# Patient Record
Sex: FEMALE | Race: WHITE | NOT HISPANIC OR LATINO | Employment: UNEMPLOYED | URBAN - METROPOLITAN AREA
[De-identification: names, ages, dates, MRNs, and addresses within clinical notes are randomized per-mention and may not be internally consistent; named-entity substitution may affect disease eponyms.]

---

## 2023-06-13 PROBLEM — N76.0 BACTERIAL VAGINOSIS: Status: ACTIVE | Noted: 2023-06-13

## 2023-06-13 PROBLEM — R07.81 RIB PAIN ON LEFT SIDE: Status: ACTIVE | Noted: 2023-06-13

## 2023-06-13 PROBLEM — J30.2 SEASONAL ALLERGIES: Status: RESOLVED | Noted: 2023-06-13 | Resolved: 2023-06-13

## 2023-06-13 PROBLEM — R10.2 PELVIC PAIN IN FEMALE: Status: ACTIVE | Noted: 2023-06-13

## 2023-06-13 PROBLEM — R07.89 CHEST PAIN, ATYPICAL: Status: RESOLVED | Noted: 2023-06-13 | Resolved: 2023-06-13

## 2023-06-13 PROBLEM — K21.9 GERD (GASTROESOPHAGEAL REFLUX DISEASE): Status: ACTIVE | Noted: 2023-06-13

## 2023-06-13 PROBLEM — R07.9 CHEST PAIN ON EXERTION: Status: ACTIVE | Noted: 2023-06-13

## 2023-06-13 PROBLEM — S99.922A INJURY OF ANKLE AND FOOT, LEFT, INITIAL ENCOUNTER: Status: RESOLVED | Noted: 2023-06-13 | Resolved: 2023-06-13

## 2023-06-13 PROBLEM — Z97.5 IUD (INTRAUTERINE DEVICE) IN PLACE: Status: ACTIVE | Noted: 2023-06-13

## 2023-06-13 PROBLEM — M89.8X1 PAIN OF RIGHT SCAPULA: Status: ACTIVE | Noted: 2023-06-13

## 2023-06-13 PROBLEM — O09.90: Status: ACTIVE | Noted: 2023-06-13

## 2023-06-13 PROBLEM — B96.89 BACTERIAL VAGINOSIS: Status: ACTIVE | Noted: 2023-06-13

## 2023-06-13 PROBLEM — M25.579 ANKLE JOINT PAIN: Status: ACTIVE | Noted: 2023-06-13

## 2023-06-13 PROBLEM — M79.18: Status: ACTIVE | Noted: 2023-06-13

## 2023-06-13 PROBLEM — R10.9 FLANK PAIN: Status: ACTIVE | Noted: 2023-06-13

## 2023-06-13 PROBLEM — E55.9 VITAMIN D DEFICIENCY: Status: ACTIVE | Noted: 2023-06-13

## 2023-06-13 PROBLEM — N92.6 IRREGULAR MENSES: Status: RESOLVED | Noted: 2023-06-13 | Resolved: 2023-06-13

## 2023-06-13 PROBLEM — Z96.9 RETAINED ORTHOPEDIC HARDWARE: Status: ACTIVE | Noted: 2023-06-13

## 2023-06-13 PROBLEM — K59.00 CONSTIPATION: Status: RESOLVED | Noted: 2023-06-13 | Resolved: 2023-06-13

## 2023-06-13 PROBLEM — R11.0 DAILY NAUSEA: Status: RESOLVED | Noted: 2023-06-13 | Resolved: 2023-06-13

## 2023-06-13 PROBLEM — N64.4 BREAST TENDERNESS IN FEMALE: Status: RESOLVED | Noted: 2023-06-13 | Resolved: 2023-06-13

## 2023-06-13 PROBLEM — M54.2 NECK PAIN, BILATERAL: Status: ACTIVE | Noted: 2023-06-13

## 2023-06-13 PROBLEM — M24.859: Status: ACTIVE | Noted: 2023-06-13

## 2023-06-13 PROBLEM — M25.539 WRIST PAIN: Status: ACTIVE | Noted: 2023-06-13

## 2023-06-13 PROBLEM — S99.912A INJURY OF ANKLE AND FOOT, LEFT, INITIAL ENCOUNTER: Status: RESOLVED | Noted: 2023-06-13 | Resolved: 2023-06-13

## 2023-06-13 PROBLEM — S16.1XXA STRAIN OF CERVICAL PORTION OF BOTH TRAPEZIUS MUSCLES: Status: RESOLVED | Noted: 2023-06-13 | Resolved: 2023-06-13

## 2023-06-13 PROBLEM — F41.9 ANXIETY: Status: ACTIVE | Noted: 2023-06-13

## 2023-06-13 PROBLEM — R53.83 FATIGUE: Status: RESOLVED | Noted: 2023-06-13 | Resolved: 2023-06-13

## 2023-06-13 PROBLEM — S93.629A SPRAIN OF TARSOMETATARSAL JOINT: Status: RESOLVED | Noted: 2023-06-13 | Resolved: 2023-06-13

## 2023-06-13 PROBLEM — G43.909 MIGRAINES: Status: ACTIVE | Noted: 2023-06-13

## 2023-06-13 PROBLEM — N89.8 VAGINAL DISCHARGE: Status: RESOLVED | Noted: 2023-06-13 | Resolved: 2023-06-13

## 2023-06-13 PROBLEM — J01.90 ACUTE SINUSITIS: Status: RESOLVED | Noted: 2023-06-13 | Resolved: 2023-06-13

## 2023-06-13 PROBLEM — H81.10 BENIGN POSITIONAL VERTIGO: Status: ACTIVE | Noted: 2023-06-13

## 2023-06-13 PROBLEM — M79.673 FOOT PAIN: Status: ACTIVE | Noted: 2023-06-13

## 2023-06-13 RX ORDER — CHOLECALCIFEROL (VITAMIN D3) 125 MCG
125 CAPSULE ORAL DAILY
COMMUNITY

## 2023-06-13 RX ORDER — SERTRALINE HYDROCHLORIDE 25 MG/1
1 TABLET, FILM COATED ORAL DAILY
COMMUNITY
Start: 2022-05-20 | End: 2023-06-14 | Stop reason: ALTCHOICE

## 2023-06-13 RX ORDER — LANOLIN ALCOHOL/MO/W.PET/CERES
1 CREAM (GRAM) TOPICAL DAILY
COMMUNITY
Start: 2020-03-18

## 2023-06-13 RX ORDER — HYDROXYZINE PAMOATE 25 MG/1
25 CAPSULE ORAL 3 TIMES DAILY PRN
COMMUNITY
Start: 2021-12-22 | End: 2023-06-14 | Stop reason: ALTCHOICE

## 2023-06-13 RX ORDER — COPPER 313.4 MG/1
1 INTRAUTERINE DEVICE INTRAUTERINE ONCE
COMMUNITY

## 2023-06-13 RX ORDER — MONTELUKAST SODIUM 10 MG/1
1 TABLET ORAL EVERY EVENING
COMMUNITY
Start: 2019-10-09 | End: 2023-06-14 | Stop reason: ALTCHOICE

## 2023-06-13 RX ORDER — SUMATRIPTAN SUCCINATE 25 MG/1
TABLET ORAL SEE ADMIN INSTRUCTIONS
COMMUNITY
Start: 2021-12-22 | End: 2023-06-14 | Stop reason: SDUPTHER

## 2023-06-14 ENCOUNTER — OFFICE VISIT (OUTPATIENT)
Dept: PRIMARY CARE | Facility: CLINIC | Age: 33
End: 2023-06-14
Payer: COMMERCIAL

## 2023-06-14 VITALS
OXYGEN SATURATION: 97 % | RESPIRATION RATE: 18 BRPM | DIASTOLIC BLOOD PRESSURE: 80 MMHG | SYSTOLIC BLOOD PRESSURE: 128 MMHG | WEIGHT: 194 LBS | HEIGHT: 63 IN | BODY MASS INDEX: 34.38 KG/M2 | HEART RATE: 100 BPM

## 2023-06-14 DIAGNOSIS — E55.9 VITAMIN D DEFICIENCY: Primary | ICD-10-CM

## 2023-06-14 DIAGNOSIS — G43.109 MIGRAINE WITH AURA AND WITHOUT STATUS MIGRAINOSUS, NOT INTRACTABLE: ICD-10-CM

## 2023-06-14 DIAGNOSIS — E53.8 B12 DEFICIENCY: ICD-10-CM

## 2023-06-14 DIAGNOSIS — G25.81 RESTLESS LEG: ICD-10-CM

## 2023-06-14 DIAGNOSIS — E66.9 OBESITY (BMI 30.0-34.9): ICD-10-CM

## 2023-06-14 PROCEDURE — 99214 OFFICE O/P EST MOD 30 MIN: CPT | Performed by: STUDENT IN AN ORGANIZED HEALTH CARE EDUCATION/TRAINING PROGRAM

## 2023-06-14 PROCEDURE — 1036F TOBACCO NON-USER: CPT | Performed by: STUDENT IN AN ORGANIZED HEALTH CARE EDUCATION/TRAINING PROGRAM

## 2023-06-14 RX ORDER — CYCLOBENZAPRINE HCL 5 MG
5 TABLET ORAL NIGHTLY PRN
Qty: 30 TABLET | Refills: 2 | Status: SHIPPED | OUTPATIENT
Start: 2023-06-14 | End: 2023-09-12

## 2023-06-14 RX ORDER — SUMATRIPTAN SUCCINATE 25 MG/1
25 TABLET ORAL SEE ADMIN INSTRUCTIONS
Qty: 9 TABLET | Refills: 1 | Status: SHIPPED | OUTPATIENT
Start: 2023-06-14 | End: 2024-02-13 | Stop reason: ALTCHOICE

## 2023-06-14 ASSESSMENT — ENCOUNTER SYMPTOMS
SHORTNESS OF BREATH: 0
TROUBLE SWALLOWING: 0
POLYPHAGIA: 0
CONSTIPATION: 0
WHEEZING: 0
ACTIVITY CHANGE: 0
WOUND: 0
CHILLS: 0
ABDOMINAL PAIN: 0
FEVER: 0
FATIGUE: 0
LOSS OF SENSATION IN FEET: 0
HEMATURIA: 0
NERVOUS/ANXIOUS: 0
ABDOMINAL DISTENTION: 0
NAUSEA: 0
COUGH: 0
DIZZINESS: 0
CONFUSION: 0
SLEEP DISTURBANCE: 0
EYE DISCHARGE: 0
SINUS PAIN: 0
POLYDIPSIA: 0
DEPRESSION: 0
BLOOD IN STOOL: 0
WEAKNESS: 0
HEADACHES: 0
OCCASIONAL FEELINGS OF UNSTEADINESS: 0

## 2023-06-14 ASSESSMENT — PATIENT HEALTH QUESTIONNAIRE - PHQ9
1. LITTLE INTEREST OR PLEASURE IN DOING THINGS: NOT AT ALL
SUM OF ALL RESPONSES TO PHQ9 QUESTIONS 1 AND 2: 0
2. FEELING DOWN, DEPRESSED OR HOPELESS: NOT AT ALL

## 2023-06-14 ASSESSMENT — ANXIETY QUESTIONNAIRES
1. FEELING NERVOUS, ANXIOUS, OR ON EDGE: NOT AT ALL
2. NOT BEING ABLE TO STOP OR CONTROL WORRYING: NOT AT ALL
3. WORRYING TOO MUCH ABOUT DIFFERENT THINGS: NOT AT ALL
7. FEELING AFRAID AS IF SOMETHING AWFUL MIGHT HAPPEN: NOT AT ALL
4. TROUBLE RELAXING: NOT AT ALL
5. BEING SO RESTLESS THAT IT IS HARD TO SIT STILL: NOT AT ALL
6. BECOMING EASILY ANNOYED OR IRRITABLE: NOT AT ALL
GAD7 TOTAL SCORE: 0

## 2023-06-14 NOTE — PROGRESS NOTES
Subjective   Patient ID: Chandan Villarreal is a 33 y.o. female who presents for check up (moving) and Leg Cramps (At night).  HPI    33-year-old female medical history of obesity, migraine with aura, restless leg, vitamin D deficiency, anxiety now in remission presents to the clinic for follow-up and evaluation of leg cramps.      Muscle cramps occur on bilateral lower extremity mostly at night.  Patient endorses urge to move her leg.  She states sometimes taking hot shower helps.  She denies fever or chills, denies chest pain.  Denies tobacco use, denies alcohol use. Was told after sleep study that he has restless leg    Her other problems include migraine headache.  States migraine occurs 2-3 times per week.  Migraine is left-side of her forehead.  Describes migraine as photophobic.  Aura include left ear tingling and pain. Treatment has been OTC tylenol. She tolerated sumatriptan and would like a refill     Medications and allergy list: Reviewed and updated    Previous labs and notes reviewed and discussed    Vitals:  Reviewed and discussed     Review of Systems   Constitutional:  Negative for activity change, chills, fatigue and fever.   HENT:  Negative for congestion, ear discharge, sinus pain and trouble swallowing.    Eyes:  Negative for discharge and visual disturbance.   Respiratory:  Negative for cough, shortness of breath and wheezing.    Cardiovascular:  Negative for chest pain and leg swelling.   Gastrointestinal:  Negative for abdominal distention, abdominal pain, blood in stool, constipation and nausea.   Endocrine: Negative for polydipsia and polyphagia.   Genitourinary:  Negative for hematuria.   Musculoskeletal:  Negative for gait problem.   Skin:  Negative for wound.   Allergic/Immunologic: Negative for food allergies.   Neurological:  Negative for dizziness, weakness and headaches.   Psychiatric/Behavioral:  Negative for confusion, sleep disturbance and suicidal ideas. The patient is not  nervous/anxious.        Objective   Physical Exam  Vitals and nursing note reviewed.   Constitutional:       Appearance: Normal appearance. She is normal weight.   HENT:      Head: Normocephalic and atraumatic.      Right Ear: Tympanic membrane normal.      Left Ear: Tympanic membrane normal.      Mouth/Throat:      Mouth: Mucous membranes are dry.   Eyes:      Extraocular Movements: Extraocular movements intact.      Conjunctiva/sclera: Conjunctivae normal.   Cardiovascular:      Rate and Rhythm: Normal rate.      Pulses: Normal pulses.   Pulmonary:      Effort: Pulmonary effort is normal. No respiratory distress.      Breath sounds: Normal breath sounds.   Abdominal:      General: Bowel sounds are normal. There is no distension.      Palpations: Abdomen is soft. There is no mass.   Musculoskeletal:         General: Normal range of motion.      Cervical back: Normal range of motion and neck supple.   Skin:     General: Skin is warm and dry.   Neurological:      General: No focal deficit present.      Mental Status: She is alert. Mental status is at baseline.   Psychiatric:         Mood and Affect: Mood normal.         Assessment/Plan   Restless leg.  We will obtain iron studies, TSH to evaluate thyroid function, serum electrolytes, and B12 level.  We will start treating patient with Flexeril as needed nightly. Side effect discussed.  If iron study is positive will start patient on iron deficiency every other day.    Migraine headache.  We will refill her sumatriptan.  She denies being pregnant.  Side effects including chest pain discussed with patient.    Obesity.  Recommend activity and dietary modification.    B12 deficiency.  We will check B12 level    Vitamin D deficiency.  We will recheck vitamin D serum level.    Health care maintenance. Will need screening for cervical cancer with PAP smear    Problem List Items Addressed This Visit       Migraines    Relevant Medications    SUMAtriptan (Imitrex) 25 mg  tablet    Other Relevant Orders    Follow Up In [facility] - PCP    Vitamin D deficiency - Primary    Relevant Orders    Vitamin D 25-Hydroxy,Total    Follow Up [facility] - PCP     Other Visit Diagnoses       B12 deficiency        Relevant Orders    Vitamin B12    Follow Up [facility] - PCP    Restless leg        Relevant Medications    cyclobenzaprine (Flexeril) 5 mg tablet    Other Relevant Orders    Comprehensive metabolic panel    CBC    Ferritin    Iron and TIBC    Vitamin D 25-Hydroxy,Total    Tsh With Reflex To Free T4 If Abnormal    Vitamin B12    Follow Up In [facility] - PCP    Obesity (BMI 30.0-34.9)        Relevant Orders    Follow Up In [facility] - PCP

## 2023-06-14 NOTE — PATIENT INSTRUCTIONS
It was nice meeting you today.     You were seen today for migraines and restless leg.    For restless leg.  We will obtain blood work to check your electrolytes, and your iron levels    For migraine you can take Tylenol as needed.  We will refill your sumatriptan.  Side effects as discussed.  If you are pregnant you should not take this medicine.     Exercise helps improve your health: I encourage you to slowly increase your activity level 10 -30 min as tolerated 3-5 times per week.     Diet: You can improve your health with low carbohydrate, low-fat and high-fiber diet.     DASH diet can help improve your blood pressure and overall health.    You can sign up for [email or web address] to view your result as well     If you need anything or have any questions, please call the clinic at [phone]     Follow up as scheduled in 6 months

## 2023-12-14 ENCOUNTER — APPOINTMENT (OUTPATIENT)
Dept: PRIMARY CARE | Facility: CLINIC | Age: 33
End: 2023-12-14
Payer: COMMERCIAL

## 2024-01-15 ENCOUNTER — LAB (OUTPATIENT)
Dept: LAB | Facility: LAB | Age: 34
End: 2024-01-15
Payer: COMMERCIAL

## 2024-01-15 DIAGNOSIS — E55.9 VITAMIN D DEFICIENCY: ICD-10-CM

## 2024-01-15 DIAGNOSIS — G25.81 RESTLESS LEG: ICD-10-CM

## 2024-01-15 DIAGNOSIS — E53.8 B12 DEFICIENCY: ICD-10-CM

## 2024-01-15 LAB
25(OH)D3 SERPL-MCNC: 19 NG/ML (ref 30–100)
ALBUMIN SERPL BCP-MCNC: 4.1 G/DL (ref 3.4–5)
ALP SERPL-CCNC: 75 U/L (ref 33–110)
ALT SERPL W P-5'-P-CCNC: 15 U/L (ref 7–45)
ANION GAP SERPL CALC-SCNC: 11 MMOL/L (ref 10–20)
AST SERPL W P-5'-P-CCNC: 14 U/L (ref 9–39)
BILIRUB SERPL-MCNC: 0.3 MG/DL (ref 0–1.2)
BUN SERPL-MCNC: 16 MG/DL (ref 6–23)
CALCIUM SERPL-MCNC: 9.2 MG/DL (ref 8.6–10.3)
CHLORIDE SERPL-SCNC: 106 MMOL/L (ref 98–107)
CO2 SERPL-SCNC: 26 MMOL/L (ref 21–32)
CREAT SERPL-MCNC: 0.68 MG/DL (ref 0.5–1.05)
EGFRCR SERPLBLD CKD-EPI 2021: >90 ML/MIN/1.73M*2
ERYTHROCYTE [DISTWIDTH] IN BLOOD BY AUTOMATED COUNT: 12.5 % (ref 11.5–14.5)
FERRITIN SERPL-MCNC: 47 NG/ML (ref 8–150)
GLUCOSE SERPL-MCNC: 77 MG/DL (ref 74–99)
HCT VFR BLD AUTO: 40.9 % (ref 36–46)
HGB BLD-MCNC: 14.2 G/DL (ref 12–16)
IRON SATN MFR SERPL: 27 % (ref 25–45)
IRON SERPL-MCNC: 87 UG/DL (ref 35–150)
MCH RBC QN AUTO: 29.3 PG (ref 26–34)
MCHC RBC AUTO-ENTMCNC: 34.7 G/DL (ref 32–36)
MCV RBC AUTO: 85 FL (ref 80–100)
NRBC BLD-RTO: 0 /100 WBCS (ref 0–0)
PLATELET # BLD AUTO: 289 X10*3/UL (ref 150–450)
POTASSIUM SERPL-SCNC: 4.2 MMOL/L (ref 3.5–5.3)
PROT SERPL-MCNC: 6.3 G/DL (ref 6.4–8.2)
RBC # BLD AUTO: 4.84 X10*6/UL (ref 4–5.2)
SODIUM SERPL-SCNC: 139 MMOL/L (ref 136–145)
TIBC SERPL-MCNC: 320 UG/DL (ref 240–445)
TSH SERPL-ACNC: 2.66 MIU/L (ref 0.44–3.98)
UIBC SERPL-MCNC: 233 UG/DL (ref 110–370)
VIT B12 SERPL-MCNC: 531 PG/ML (ref 211–911)
WBC # BLD AUTO: 8.6 X10*3/UL (ref 4.4–11.3)

## 2024-01-15 PROCEDURE — 82306 VITAMIN D 25 HYDROXY: CPT

## 2024-01-15 PROCEDURE — 83540 ASSAY OF IRON: CPT

## 2024-01-15 PROCEDURE — 82728 ASSAY OF FERRITIN: CPT

## 2024-01-15 PROCEDURE — 36415 COLL VENOUS BLD VENIPUNCTURE: CPT

## 2024-01-15 PROCEDURE — 84443 ASSAY THYROID STIM HORMONE: CPT

## 2024-01-15 PROCEDURE — 80053 COMPREHEN METABOLIC PANEL: CPT

## 2024-01-15 PROCEDURE — 83550 IRON BINDING TEST: CPT

## 2024-01-15 PROCEDURE — 85027 COMPLETE CBC AUTOMATED: CPT

## 2024-01-15 PROCEDURE — 82607 VITAMIN B-12: CPT

## 2024-02-13 ENCOUNTER — OFFICE VISIT (OUTPATIENT)
Dept: PRIMARY CARE | Facility: CLINIC | Age: 34
End: 2024-02-13
Payer: COMMERCIAL

## 2024-02-13 VITALS
BODY MASS INDEX: 34.23 KG/M2 | HEIGHT: 62 IN | SYSTOLIC BLOOD PRESSURE: 110 MMHG | WEIGHT: 186 LBS | HEART RATE: 91 BPM | DIASTOLIC BLOOD PRESSURE: 74 MMHG

## 2024-02-13 DIAGNOSIS — G43.709 CHRONIC MIGRAINE WITHOUT AURA WITHOUT STATUS MIGRAINOSUS, NOT INTRACTABLE: Primary | ICD-10-CM

## 2024-02-13 DIAGNOSIS — Z97.5 IUD (INTRAUTERINE DEVICE) IN PLACE: ICD-10-CM

## 2024-02-13 DIAGNOSIS — E55.9 VITAMIN D DEFICIENCY: ICD-10-CM

## 2024-02-13 PROBLEM — R53.82 CHRONIC FATIGUE: Status: ACTIVE | Noted: 2024-02-13

## 2024-02-13 PROCEDURE — 99214 OFFICE O/P EST MOD 30 MIN: CPT | Performed by: FAMILY MEDICINE

## 2024-02-13 PROCEDURE — 1036F TOBACCO NON-USER: CPT | Performed by: FAMILY MEDICINE

## 2024-02-13 RX ORDER — ERGOCALCIFEROL 1.25 MG/1
1.25 CAPSULE ORAL
Qty: 4 CAPSULE | Refills: 5 | Status: SHIPPED | OUTPATIENT
Start: 2024-02-13 | End: 2025-02-12

## 2024-02-13 RX ORDER — MAGNESIUM 250 MG
250 TABLET ORAL DAILY
Qty: 30 TABLET | Refills: 11 | Status: SHIPPED | OUTPATIENT
Start: 2024-02-13 | End: 2025-02-12

## 2024-02-13 ASSESSMENT — COLUMBIA-SUICIDE SEVERITY RATING SCALE - C-SSRS
1. IN THE PAST MONTH, HAVE YOU WISHED YOU WERE DEAD OR WISHED YOU COULD GO TO SLEEP AND NOT WAKE UP?: NO
6. HAVE YOU EVER DONE ANYTHING, STARTED TO DO ANYTHING, OR PREPARED TO DO ANYTHING TO END YOUR LIFE?: NO
2. HAVE YOU ACTUALLY HAD ANY THOUGHTS OF KILLING YOURSELF?: NO

## 2024-02-13 ASSESSMENT — PATIENT HEALTH QUESTIONNAIRE - PHQ9
2. FEELING DOWN, DEPRESSED OR HOPELESS: NOT AT ALL
1. LITTLE INTEREST OR PLEASURE IN DOING THINGS: NOT AT ALL
SUM OF ALL RESPONSES TO PHQ9 QUESTIONS 1 AND 2: 0

## 2024-02-13 ASSESSMENT — ENCOUNTER SYMPTOMS
DEPRESSION: 0
FATIGUE: 1
OCCASIONAL FEELINGS OF UNSTEADINESS: 0
LOSS OF SENSATION IN FEET: 0

## 2024-02-13 NOTE — PATIENT INSTRUCTIONS
Assessment/Plan   Diagnoses and all orders for this visit:  Chronic migraine without aura without status migrainosus, not intractable  -     magnesium 250 mg tablet; Take 1 tablet (250 mg) by mouth once daily.  Vitamin D deficiency  -     ergocalciferol (DrisdoL) 1.25 MG (44976 UT) capsule; Take 1 capsule (1,250 mcg) by mouth 1 (one) time per week.  IUD (intrauterine device) in place    Discussed at length increasing protein, water drinking, and adding vitamin D. Try increasing to improve weight loss See list.   Try and increase steps to [address] and add exercise to regimen.   Follow up as needed.

## 2024-02-13 NOTE — PROGRESS NOTES
Subjective   Patient ID: Chandan Villarreal is a 33 y.o. female.    HPI  33 year old female for follow up. Does have history of leg cramps, was told she could do bath or medications.Mostly in evening and sometimes while sleeping.   To establish here as she saw Dr. Farr. She is tired. Children.has 13 and 12 year old . Does not work. Sleeps from 11 to 10 am,    age 19, and then 20.5 had at 21, then had chest pain during pregnancy, and had blood clot, had lovenox for the rest of pregnancy, after delivery, stopped shots, and then got another pulmonary embolus. Then took oral medications from , coumadin, then Xarelto. Took for ten years, and stopped. Stopped just last year.   [facility]  Review of Systems   Constitutional:  Positive for fatigue.   All other systems reviewed and are negative.    Objective   Physical Exam  Vitals reviewed.   Constitutional:       Appearance: Normal appearance.   HENT:      Head: Normocephalic and atraumatic.      Right Ear: Tympanic membrane normal.      Left Ear: Tympanic membrane normal.      Nose: Nose normal.      Mouth/Throat:      Mouth: Mucous membranes are moist.      Pharynx: Oropharynx is clear.   Eyes:      Extraocular Movements: Extraocular movements intact.      Conjunctiva/sclera: Conjunctivae normal.      Pupils: Pupils are equal, round, and reactive to light.   Cardiovascular:      Rate and Rhythm: Normal rate and regular rhythm.      Pulses: Normal pulses.      Heart sounds: Normal heart sounds.   Pulmonary:      Effort: Pulmonary effort is normal.      Breath sounds: Normal breath sounds.   Abdominal:      General: Abdomen is flat. Bowel sounds are normal.      Palpations: Abdomen is soft.   Musculoskeletal:         General: Normal range of motion.      Cervical back: Normal range of motion and neck supple.   Skin:     General: Skin is warm and dry.      Capillary Refill: Capillary refill takes less than 2 seconds.   Neurological:      General: No focal  deficit present.      Mental Status: She is alert and oriented to person, place, and time.   Psychiatric:         Mood and Affect: Mood normal.         Behavior: Behavior normal.       Assessment/Plan   Diagnoses and all orders for this visit:  Chronic migraine without aura without status migrainosus, not intractable  -     magnesium 250 mg tablet; Take 1 tablet (250 mg) by mouth once daily.  Vitamin D deficiency  -     ergocalciferol (DrisdoL) 1.25 MG (89568 UT) capsule; Take 1 capsule (1,250 mcg) by mouth 1 (one) time per week.  IUD (intrauterine device) in place    Discussed at length increasing protein, water drinking, and adding vitamin D. Try increasing to improve weight loss See list.   Try and increase steps to 23727 and add exercise to regimen.   Follow up as needed.

## 2024-07-18 ENCOUNTER — TELEPHONE (OUTPATIENT)
Dept: PRIMARY CARE | Facility: CLINIC | Age: 34
End: 2024-07-18
Payer: COMMERCIAL

## 2024-07-18 NOTE — TELEPHONE ENCOUNTER
Patient says that she has been having chest pains when she breathes deeply. She says she has been working out. Please advise. Patient is scheduled to see [facility] 7/19/24

## 2024-07-19 ENCOUNTER — APPOINTMENT (OUTPATIENT)
Dept: RADIOLOGY | Facility: HOSPITAL | Age: 34
End: 2024-07-19
Payer: COMMERCIAL

## 2024-07-19 ENCOUNTER — APPOINTMENT (OUTPATIENT)
Dept: CARDIOLOGY | Facility: HOSPITAL | Age: 34
End: 2024-07-19
Payer: COMMERCIAL

## 2024-07-19 ENCOUNTER — APPOINTMENT (OUTPATIENT)
Dept: PRIMARY CARE | Facility: CLINIC | Age: 34
End: 2024-07-19
Payer: COMMERCIAL

## 2024-07-19 ENCOUNTER — HOSPITAL ENCOUNTER (INPATIENT)
Facility: HOSPITAL | Age: 34
LOS: 1 days | Discharge: HOME | End: 2024-07-20
Attending: EMERGENCY MEDICINE | Admitting: INTERNAL MEDICINE
Payer: COMMERCIAL

## 2024-07-19 DIAGNOSIS — I26.92 ACUTE SADDLE PULMONARY EMBOLISM WITHOUT ACUTE COR PULMONALE (MULTI): Primary | ICD-10-CM

## 2024-07-19 LAB
ALBUMIN SERPL BCP-MCNC: 4.1 G/DL (ref 3.4–5)
ALP SERPL-CCNC: 71 U/L (ref 33–110)
ALT SERPL W P-5'-P-CCNC: 14 U/L (ref 7–45)
ANION GAP SERPL CALC-SCNC: 11 MMOL/L (ref 10–20)
AST SERPL W P-5'-P-CCNC: 14 U/L (ref 9–39)
ATRIAL RATE: 90 BPM
B-HCG SERPL-ACNC: <2 MIU/ML
BASOPHILS # BLD AUTO: 0.08 X10*3/UL (ref 0–0.1)
BASOPHILS NFR BLD AUTO: 0.8 %
BILIRUB SERPL-MCNC: 0.4 MG/DL (ref 0–1.2)
BUN SERPL-MCNC: 15 MG/DL (ref 6–23)
CALCIUM SERPL-MCNC: 9 MG/DL (ref 8.6–10.3)
CARDIAC TROPONIN I PNL SERPL HS: <3 NG/L (ref 0–13)
CARDIAC TROPONIN I PNL SERPL HS: <3 NG/L (ref 0–13)
CHLORIDE SERPL-SCNC: 107 MMOL/L (ref 98–107)
CO2 SERPL-SCNC: 25 MMOL/L (ref 21–32)
CREAT SERPL-MCNC: 0.74 MG/DL (ref 0.5–1.05)
D DIMER PPP FEU-MCNC: 3359 NG/ML FEU
EGFRCR SERPLBLD CKD-EPI 2021: >90 ML/MIN/1.73M*2
EJECTION FRACTION APICAL 4 CHAMBER: 65.8
EJECTION FRACTION: 66 %
EOSINOPHIL # BLD AUTO: 0.27 X10*3/UL (ref 0–0.7)
EOSINOPHIL NFR BLD AUTO: 2.6 %
ERYTHROCYTE [DISTWIDTH] IN BLOOD BY AUTOMATED COUNT: 12.6 % (ref 11.5–14.5)
GLOBAL LONGITUDINAL STRAIN: 19 %
GLUCOSE SERPL-MCNC: 89 MG/DL (ref 74–99)
HCT VFR BLD AUTO: 40.7 % (ref 36–46)
HGB BLD-MCNC: 14.3 G/DL (ref 12–16)
IMM GRANULOCYTES # BLD AUTO: 0.03 X10*3/UL (ref 0–0.7)
IMM GRANULOCYTES NFR BLD AUTO: 0.3 % (ref 0–0.9)
LEFT ATRIUM VOLUME AREA LENGTH INDEX BSA: 14.5 ML/M2
LEFT VENTRICLE INTERNAL DIMENSION DIASTOLE: 4.94 CM (ref 3.5–6)
LEFT VENTRICULAR OUTFLOW TRACT DIAMETER: 1.91 CM
LV EJECTION FRACTION BIPLANE: 66 %
LYMPHOCYTES # BLD AUTO: 3.42 X10*3/UL (ref 1.2–4.8)
LYMPHOCYTES NFR BLD AUTO: 32.9 %
MAGNESIUM SERPL-MCNC: 2.13 MG/DL (ref 1.6–2.4)
MCH RBC QN AUTO: 29.9 PG (ref 26–34)
MCHC RBC AUTO-ENTMCNC: 35.1 G/DL (ref 32–36)
MCV RBC AUTO: 85 FL (ref 80–100)
MITRAL VALVE E/A RATIO: 1.06
MITRAL VALVE E/E' RATIO: 6.45
MONOCYTES # BLD AUTO: 0.85 X10*3/UL (ref 0.1–1)
MONOCYTES NFR BLD AUTO: 8.2 %
NEUTROPHILS # BLD AUTO: 5.74 X10*3/UL (ref 1.2–7.7)
NEUTROPHILS NFR BLD AUTO: 55.2 %
NRBC BLD-RTO: 0 /100 WBCS (ref 0–0)
P AXIS: 33 DEGREES
PLATELET # BLD AUTO: 275 X10*3/UL (ref 150–450)
POTASSIUM SERPL-SCNC: 3.7 MMOL/L (ref 3.5–5.3)
PR INTERVAL: 123 MS
PROT SERPL-MCNC: 6.6 G/DL (ref 6.4–8.2)
Q ONSET: 251 MS
QRS COUNT: 14 BEATS
QRS DURATION: 95 MS
QT INTERVAL: 364 MS
QTC CALCULATION(BAZETT): 441 MS
QTC FREDERICIA: 413 MS
R AXIS: 15 DEGREES
RBC # BLD AUTO: 4.79 X10*6/UL (ref 4–5.2)
RIGHT VENTRICLE FREE WALL PEAK S': 9.07 CM/S
RIGHT VENTRICLE PEAK SYSTOLIC PRESSURE: 24.1 MMHG
SODIUM SERPL-SCNC: 139 MMOL/L (ref 136–145)
T AXIS: 21 DEGREES
T OFFSET: 433 MS
TRICUSPID ANNULAR PLANE SYSTOLIC EXCURSION: 1.9 CM
TSH SERPL-ACNC: 2.98 MIU/L (ref 0.44–3.98)
UFH PPP CHRO-ACNC: 0.6 IU/ML
UFH PPP CHRO-ACNC: 0.6 IU/ML
VENTRICULAR RATE: 88 BPM
WBC # BLD AUTO: 10.4 X10*3/UL (ref 4.4–11.3)

## 2024-07-19 PROCEDURE — 83735 ASSAY OF MAGNESIUM: CPT | Performed by: EMERGENCY MEDICINE

## 2024-07-19 PROCEDURE — 99254 IP/OBS CNSLTJ NEW/EST MOD 60: CPT | Performed by: INTERNAL MEDICINE

## 2024-07-19 PROCEDURE — 80053 COMPREHEN METABOLIC PANEL: CPT | Performed by: EMERGENCY MEDICINE

## 2024-07-19 PROCEDURE — 2500000004 HC RX 250 GENERAL PHARMACY W/ HCPCS (ALT 636 FOR OP/ED): Performed by: EMERGENCY MEDICINE

## 2024-07-19 PROCEDURE — 84702 CHORIONIC GONADOTROPIN TEST: CPT | Performed by: EMERGENCY MEDICINE

## 2024-07-19 PROCEDURE — 85025 COMPLETE CBC W/AUTO DIFF WBC: CPT | Performed by: EMERGENCY MEDICINE

## 2024-07-19 PROCEDURE — 84443 ASSAY THYROID STIM HORMONE: CPT | Performed by: INTERNAL MEDICINE

## 2024-07-19 PROCEDURE — 71275 CT ANGIOGRAPHY CHEST: CPT | Mod: FOREIGN READ | Performed by: RADIOLOGY

## 2024-07-19 PROCEDURE — 1200000002 HC GENERAL ROOM WITH TELEMETRY DAILY

## 2024-07-19 PROCEDURE — 2550000001 HC RX 255 CONTRASTS: Performed by: EMERGENCY MEDICINE

## 2024-07-19 PROCEDURE — 93356 MYOCRD STRAIN IMG SPCKL TRCK: CPT

## 2024-07-19 PROCEDURE — 99223 1ST HOSP IP/OBS HIGH 75: CPT | Performed by: INTERNAL MEDICINE

## 2024-07-19 PROCEDURE — 96374 THER/PROPH/DIAG INJ IV PUSH: CPT

## 2024-07-19 PROCEDURE — 84484 ASSAY OF TROPONIN QUANT: CPT | Performed by: EMERGENCY MEDICINE

## 2024-07-19 PROCEDURE — G0378 HOSPITAL OBSERVATION PER HR: HCPCS

## 2024-07-19 PROCEDURE — 85520 HEPARIN ASSAY: CPT | Performed by: INTERNAL MEDICINE

## 2024-07-19 PROCEDURE — 93356 MYOCRD STRAIN IMG SPCKL TRCK: CPT | Performed by: INTERNAL MEDICINE

## 2024-07-19 PROCEDURE — 85379 FIBRIN DEGRADATION QUANT: CPT | Performed by: EMERGENCY MEDICINE

## 2024-07-19 PROCEDURE — 93005 ELECTROCARDIOGRAM TRACING: CPT

## 2024-07-19 PROCEDURE — 93306 TTE W/DOPPLER COMPLETE: CPT

## 2024-07-19 PROCEDURE — 36415 COLL VENOUS BLD VENIPUNCTURE: CPT | Performed by: EMERGENCY MEDICINE

## 2024-07-19 PROCEDURE — 93306 TTE W/DOPPLER COMPLETE: CPT | Performed by: INTERNAL MEDICINE

## 2024-07-19 PROCEDURE — 71275 CT ANGIOGRAPHY CHEST: CPT

## 2024-07-19 PROCEDURE — 2500000004 HC RX 250 GENERAL PHARMACY W/ HCPCS (ALT 636 FOR OP/ED): Performed by: INTERNAL MEDICINE

## 2024-07-19 PROCEDURE — 99285 EMERGENCY DEPT VISIT HI MDM: CPT | Mod: 25

## 2024-07-19 RX ORDER — HEPARIN SODIUM 10000 [USP'U]/100ML
0-4500 INJECTION, SOLUTION INTRAVENOUS CONTINUOUS
Status: DISCONTINUED | OUTPATIENT
Start: 2024-07-19 | End: 2024-07-20 | Stop reason: SDUPTHER

## 2024-07-19 RX ORDER — ACETAMINOPHEN 325 MG/1
650 TABLET ORAL EVERY 4 HOURS PRN
Status: DISCONTINUED | OUTPATIENT
Start: 2024-07-19 | End: 2024-07-20 | Stop reason: HOSPADM

## 2024-07-19 RX ORDER — MORPHINE SULFATE 4 MG/ML
4 INJECTION INTRAVENOUS EVERY 4 HOURS PRN
Status: DISCONTINUED | OUTPATIENT
Start: 2024-07-19 | End: 2024-07-20 | Stop reason: HOSPADM

## 2024-07-19 RX ORDER — ONDANSETRON HYDROCHLORIDE 2 MG/ML
4 INJECTION, SOLUTION INTRAVENOUS EVERY 6 HOURS PRN
Status: DISCONTINUED | OUTPATIENT
Start: 2024-07-19 | End: 2024-07-20 | Stop reason: HOSPADM

## 2024-07-19 RX ADMIN — ONDANSETRON 4 MG: 2 INJECTION INTRAMUSCULAR; INTRAVENOUS at 22:56

## 2024-07-19 RX ADMIN — HEPARIN SODIUM 1500 UNITS/HR: 10000 INJECTION, SOLUTION INTRAVENOUS at 03:50

## 2024-07-19 RX ADMIN — HEPARIN SODIUM 1300 UNITS/HR: 10000 INJECTION, SOLUTION INTRAVENOUS at 18:42

## 2024-07-19 RX ADMIN — MORPHINE SULFATE 4 MG: 4 INJECTION, SOLUTION INTRAMUSCULAR; INTRAVENOUS at 16:27

## 2024-07-19 RX ADMIN — IOHEXOL 75 ML: 350 INJECTION, SOLUTION INTRAVENOUS at 02:52

## 2024-07-19 RX ADMIN — ACETAMINOPHEN 650 MG: 325 TABLET ORAL at 21:33

## 2024-07-19 RX ADMIN — ACETAMINOPHEN 650 MG: 325 TABLET ORAL at 12:47

## 2024-07-19 SDOH — ECONOMIC STABILITY: FOOD INSECURITY: HOW HARD IS IT FOR YOU TO PAY FOR THE VERY BASICS LIKE FOOD, HOUSING, MEDICAL CARE, AND HEATING?: NOT HARD AT ALL

## 2024-07-19 SDOH — SOCIAL STABILITY: SOCIAL INSECURITY: HAS ANYONE EVER THREATENED TO HURT YOUR FAMILY OR YOUR PETS?: NO

## 2024-07-19 SDOH — SOCIAL STABILITY: SOCIAL INSECURITY: ABUSE: ADULT

## 2024-07-19 SDOH — HEALTH STABILITY: MENTAL HEALTH: HOW OFTEN DO YOU HAVE A DRINK CONTAINING ALCOHOL?: NEVER

## 2024-07-19 SDOH — HEALTH STABILITY: MENTAL HEALTH: HOW OFTEN DO YOU HAVE 6 OR MORE DRINKS ON ONE OCCASION?: NEVER

## 2024-07-19 SDOH — HEALTH STABILITY: MENTAL HEALTH: HOW OFTEN DO YOU HAVE SIX OR MORE DRINKS ON ONE OCCASION?: NEVER

## 2024-07-19 SDOH — SOCIAL STABILITY: SOCIAL INSECURITY: DOES ANYONE TRY TO KEEP YOU FROM HAVING/CONTACTING OTHER FRIENDS OR DOING THINGS OUTSIDE YOUR HOME?: NO

## 2024-07-19 SDOH — ECONOMIC STABILITY: INCOME INSECURITY: IN THE LAST 12 MONTHS, WAS THERE A TIME WHEN YOU WERE NOT ABLE TO PAY THE MORTGAGE OR RENT ON TIME?: NO

## 2024-07-19 SDOH — SOCIAL STABILITY: SOCIAL INSECURITY: DO YOU FEEL ANYONE HAS EXPLOITED OR TAKEN ADVANTAGE OF YOU FINANCIALLY OR OF YOUR PERSONAL PROPERTY?: NO

## 2024-07-19 SDOH — SOCIAL STABILITY: SOCIAL INSECURITY: HAVE YOU HAD THOUGHTS OF HARMING ANYONE ELSE?: NO

## 2024-07-19 SDOH — SOCIAL STABILITY: SOCIAL INSECURITY: ARE THERE ANY APPARENT SIGNS OF INJURIES/BEHAVIORS THAT COULD BE RELATED TO ABUSE/NEGLECT?: NO

## 2024-07-19 SDOH — SOCIAL STABILITY: SOCIAL INSECURITY: HAVE YOU HAD ANY THOUGHTS OF HARMING ANYONE ELSE?: NO

## 2024-07-19 SDOH — HEALTH STABILITY: MENTAL HEALTH: HOW MANY STANDARD DRINKS CONTAINING ALCOHOL DO YOU HAVE ON A TYPICAL DAY?: PATIENT DOES NOT DRINK

## 2024-07-19 SDOH — ECONOMIC STABILITY: HOUSING INSECURITY: IN THE LAST 12 MONTHS, WAS THERE A TIME WHEN YOU WERE NOT ABLE TO PAY THE MORTGAGE OR RENT ON TIME?: NO

## 2024-07-19 SDOH — SOCIAL STABILITY: SOCIAL INSECURITY: WERE YOU ABLE TO COMPLETE ALL THE BEHAVIORAL HEALTH SCREENINGS?: YES

## 2024-07-19 SDOH — SOCIAL STABILITY: SOCIAL INSECURITY: DO YOU FEEL UNSAFE GOING BACK TO THE PLACE WHERE YOU ARE LIVING?: NO

## 2024-07-19 SDOH — ECONOMIC STABILITY: HOUSING INSECURITY: AT ANY TIME IN THE PAST 12 MONTHS, WERE YOU HOMELESS OR LIVING IN A SHELTER (INCLUDING NOW)?: NO

## 2024-07-19 SDOH — SOCIAL STABILITY: SOCIAL INSECURITY: ARE YOU OR HAVE YOU BEEN THREATENED OR ABUSED PHYSICALLY, EMOTIONALLY, OR SEXUALLY BY ANYONE?: NO

## 2024-07-19 SDOH — ECONOMIC STABILITY: TRANSPORTATION INSECURITY
IN THE PAST 12 MONTHS, HAS THE LACK OF TRANSPORTATION KEPT YOU FROM MEDICAL APPOINTMENTS OR FROM GETTING MEDICATIONS?: NO

## 2024-07-19 SDOH — ECONOMIC STABILITY: HOUSING INSECURITY: IN THE PAST 12 MONTHS, HOW MANY TIMES HAVE YOU MOVED WHERE YOU WERE LIVING?: 0

## 2024-07-19 SDOH — ECONOMIC STABILITY: TRANSPORTATION INSECURITY: IN THE PAST 12 MONTHS, HAS LACK OF TRANSPORTATION KEPT YOU FROM MEDICAL APPOINTMENTS OR FROM GETTING MEDICATIONS?: NO

## 2024-07-19 SDOH — ECONOMIC STABILITY: INCOME INSECURITY: HOW HARD IS IT FOR YOU TO PAY FOR THE VERY BASICS LIKE FOOD, HOUSING, MEDICAL CARE, AND HEATING?: NOT HARD AT ALL

## 2024-07-19 SDOH — HEALTH STABILITY: MENTAL HEALTH: HOW MANY DRINKS CONTAINING ALCOHOL DO YOU HAVE ON A TYPICAL DAY WHEN YOU ARE DRINKING?: PATIENT DOES NOT DRINK

## 2024-07-19 SDOH — ECONOMIC STABILITY: TRANSPORTATION INSECURITY
IN THE PAST 12 MONTHS, HAS LACK OF TRANSPORTATION KEPT YOU FROM MEETINGS, WORK, OR FROM GETTING THINGS NEEDED FOR DAILY LIVING?: NO

## 2024-07-19 ASSESSMENT — ENCOUNTER SYMPTOMS
DIARRHEA: 0
SORE THROAT: 0
DIZZINESS: 0
BLOOD IN STOOL: 0
APPETITE CHANGE: 0
MYALGIAS: 0
WEAKNESS: 0
BRUISES/BLEEDS EASILY: 0
CONFUSION: 0
ABDOMINAL PAIN: 0
TREMORS: 0
SHORTNESS OF BREATH: 0
VOMITING: 0
CHILLS: 0
LIGHT-HEADEDNESS: 0
SEIZURES: 0
NERVOUS/ANXIOUS: 0
SINUS PAIN: 0
RHINORRHEA: 0
ARTHRALGIAS: 0
TROUBLE SWALLOWING: 0
WHEEZING: 0
RECTAL PAIN: 0
POLYPHAGIA: 0
NUMBNESS: 0
DECREASED CONCENTRATION: 0
DIFFICULTY URINATING: 0
HEADACHES: 0
PALPITATIONS: 0
NAUSEA: 0
DIAPHORESIS: 0
FACIAL SWELLING: 0
FATIGUE: 1
COLOR CHANGE: 0
HEMATURIA: 0
POLYDIPSIA: 0
JOINT SWELLING: 0
NECK STIFFNESS: 0
CONSTIPATION: 0
FACIAL ASYMMETRY: 0
BACK PAIN: 0
FLANK PAIN: 0
WOUND: 0
ADENOPATHY: 0
SINUS PRESSURE: 0
NECK PAIN: 0
SPEECH DIFFICULTY: 0
COUGH: 0
PHOTOPHOBIA: 0
VOICE CHANGE: 0
DYSURIA: 0
ABDOMINAL DISTENTION: 0
CHEST TIGHTNESS: 0
UNEXPECTED WEIGHT CHANGE: 0
FREQUENCY: 0
FEVER: 0
ACTIVITY CHANGE: 1

## 2024-07-19 ASSESSMENT — COGNITIVE AND FUNCTIONAL STATUS - GENERAL
MOBILITY SCORE: 24
MOBILITY SCORE: 24
PATIENT BASELINE BEDBOUND: NO
DAILY ACTIVITIY SCORE: 24
MOBILITY SCORE: 24
DAILY ACTIVITIY SCORE: 24
DAILY ACTIVITIY SCORE: 24

## 2024-07-19 ASSESSMENT — PAIN SCALES - GENERAL
PAINLEVEL_OUTOF10: 8
PAINLEVEL_OUTOF10: 7
PAINLEVEL_OUTOF10: 2
PAINLEVEL_OUTOF10: 0 - NO PAIN
PAINLEVEL_OUTOF10: 6
PAINLEVEL_OUTOF10: 0 - NO PAIN

## 2024-07-19 ASSESSMENT — ACTIVITIES OF DAILY LIVING (ADL)
WALKS IN HOME: INDEPENDENT
LACK_OF_TRANSPORTATION: NO
BATHING: INDEPENDENT
HEARING - LEFT EAR: FUNCTIONAL
LACK_OF_TRANSPORTATION: NO
HEARING - RIGHT EAR: FUNCTIONAL
DRESSING YOURSELF: INDEPENDENT
PATIENT'S MEMORY ADEQUATE TO SAFELY COMPLETE DAILY ACTIVITIES?: YES
TOILETING: INDEPENDENT
FEEDING YOURSELF: INDEPENDENT
ADEQUATE_TO_COMPLETE_ADL: YES
JUDGMENT_ADEQUATE_SAFELY_COMPLETE_DAILY_ACTIVITIES: YES
GROOMING: INDEPENDENT

## 2024-07-19 ASSESSMENT — PATIENT HEALTH QUESTIONNAIRE - PHQ9
1. LITTLE INTEREST OR PLEASURE IN DOING THINGS: NOT AT ALL
2. FEELING DOWN, DEPRESSED OR HOPELESS: NOT AT ALL
SUM OF ALL RESPONSES TO PHQ9 QUESTIONS 1 & 2: 0

## 2024-07-19 ASSESSMENT — PAIN - FUNCTIONAL ASSESSMENT
PAIN_FUNCTIONAL_ASSESSMENT: 0-10

## 2024-07-19 ASSESSMENT — LIFESTYLE VARIABLES
EVER HAD A DRINK FIRST THING IN THE MORNING TO STEADY YOUR NERVES TO GET RID OF A HANGOVER: NO
AUDIT-C TOTAL SCORE: 0
EVER FELT BAD OR GUILTY ABOUT YOUR DRINKING: NO
AUDIT-C TOTAL SCORE: 0
HAVE PEOPLE ANNOYED YOU BY CRITICIZING YOUR DRINKING: NO
SKIP TO QUESTIONS 9-10: 1
TOTAL SCORE: 0
SKIP TO QUESTIONS 9-10: 1
HOW MANY STANDARD DRINKS CONTAINING ALCOHOL DO YOU HAVE ON A TYPICAL DAY: PATIENT DOES NOT DRINK
HAVE YOU EVER FELT YOU SHOULD CUT DOWN ON YOUR DRINKING: NO
AUDIT-C TOTAL SCORE: 0
HOW OFTEN DO YOU HAVE A DRINK CONTAINING ALCOHOL: NEVER
HOW OFTEN DO YOU HAVE 6 OR MORE DRINKS ON ONE OCCASION: NEVER

## 2024-07-19 ASSESSMENT — PAIN DESCRIPTION - ORIENTATION: ORIENTATION: MID

## 2024-07-19 ASSESSMENT — COLUMBIA-SUICIDE SEVERITY RATING SCALE - C-SSRS
6. HAVE YOU EVER DONE ANYTHING, STARTED TO DO ANYTHING, OR PREPARED TO DO ANYTHING TO END YOUR LIFE?: NO
2. HAVE YOU ACTUALLY HAD ANY THOUGHTS OF KILLING YOURSELF?: NO
1. IN THE PAST MONTH, HAVE YOU WISHED YOU WERE DEAD OR WISHED YOU COULD GO TO SLEEP AND NOT WAKE UP?: NO

## 2024-07-19 ASSESSMENT — PAIN DESCRIPTION - DESCRIPTORS
DESCRIPTORS: DISCOMFORT;PRESSURE
DESCRIPTORS: DISCOMFORT;PRESSURE

## 2024-07-19 ASSESSMENT — PAIN DESCRIPTION - LOCATION: LOCATION: CHEST

## 2024-07-19 ASSESSMENT — PAIN DESCRIPTION - PAIN TYPE: TYPE: ACUTE PAIN

## 2024-07-19 NOTE — NURSING NOTE
Heparin assay drawn at 1000. Hematology called at 11:00 stating the heparin assay result was 1.1. Initially hematology thought the lab needed to be re-drawn, but it did not. Original heparin assay was not charted in the lab results. Heparin drip was stopped per orders. Heparin drip was restarted at 13:00 due to patient being off the unit and was restarted at a lower rate per order. Heparin assay drawn at 1700, resulted at 0.6.

## 2024-07-19 NOTE — ED PROVIDER NOTES
Chandan Villarreal is a 34 y.o. patient presenting to the ED for right-sided chest pain.  The patient states that she has a remote history of blood clots in her lungs.  These occurred while she was pregnant.  She was on Eliquis for approximately 10 years.  Last year she was told by a new doctor that she did not need to continue to be on this medication since they were provoked by pregnancy.  She has not been on any blood thinners since.  The pain is worse with deep breathing and exertion.  It does make her feel short of breath when she tries to lie down or exert herself.  She denies any recent leg swelling.  She denies any recent fever or feeling ill.    Additional History Obtained from: none  Limitations to History: none  ------------------------------------------------------------------------------------------------------------------------------------------  Physical Exam:  Appearance: Alert, cooperative,   Skin: Warm, dry, appropriate color for ethnicity.  Eyes: Cornea clear. No scleral icterus or injection.   ENT: Mucous membranes moist.  Pulmonary: No accessory muscle use or stridor. Clear lung sounds bilaterally without rhonchi or wheezing.   Cardiac: Heart sounds regular without murmur. B/L radial pulses full and symmetric.   Abdomen: Soft, not tender.  No rebound or guarding.   Musculoskeletal: No gross deformities.   Neurological: Face symmetrical. Voice clear. Appropriately conversant.   Psychiatric: Appropriate mood and affect.    Medical Decision Making:  Chronic Medical Conditions Significantly Affecting Care:  has a past medical history of Acute sinusitis (06/13/2023), Chest pain, atypical (06/13/2023), Constipation (06/13/2023), Daily nausea (06/13/2023), Fatigue (06/13/2023), Injury of ankle and foot, left, initial encounter (06/13/2023), Irregular menses (06/13/2023), Pain in unspecified foot (12/17/2019), Personal history of pulmonary embolism (02/04/2021), Seasonal allergies (06/13/2023), and  Vaginal discharge (06/13/2023).    Social Determinants of Health Significantly Affecting Care:    Differential Diagnosis Considered but not limited to:       External Records Reviewed:   I reviewed recent and relevant outside records including:     Independent Interpretation of Studies: The following studies were ordered as part of the emergency department work up and independently interpreted by me. See ED Course for details.    ED Course as of 07/22/24 0117 Fri Jul 19, 2024 0318 I discussed CT with the radiologist.  He states there is a very large right-sided PE that is nearly occlusive.  There is additional incidental finding of soft tissue mass adjacent to the esophagus. [SP]   0318 Given the large clot burden and near occlusive nature of the PE the patient was started on heparin.  She remains hemodynamically stable without evidence of right heart strain.  Troponin is normal. [SP]   Sat Jul 20, 2024 0427 EKG performed at 0057 and interpreted by me shows sinus rhythm at a rate of 88.  Intervals are normal.  The axis is normal.  There are no ST elevations or depressions.  No T wave changes.  No STEMI. [SP]      ED Course User Index  [SP] Nieves Cantu DO         Diagnoses as of 07/22/24 0117   Acute saddle pulmonary embolism without acute cor pulmonale (Multi)         Escalation of Care:  I discussed the results with the patient.  I did recommend admission.  She is agreeable to this.  I discussed with Dr. Garcia, hospitalist who will admit the patient for further care.       Nieves Cantu DO  07/22/24 0118

## 2024-07-19 NOTE — PROGRESS NOTES
Chandan Villarreal is a 34 y.o. female on day 0 of admission presenting with Acute saddle pulmonary embolism without acute cor pulmonale (Multi).      Subjective   Chandan Villarreal is a 34 y.o.  female with several Pes. She had her 1st PE >10yrs ago during her pregnancy and was on heparin shots (per hematology) and this was discontinued post-partum in 2011. She thinks a few months after (unsure on timing exactly) she developed another clot in her lungs and was on Xarelto starting in 2012 until 2022 when it was discontinued under the direction of hematology (dr. Maya) who she saw on 02/28/22 per outpatient records. She has been doing great since then until about the last week. She stated that she developed worsening shortness of breath and right sided sharp/squeezing chest pain. It became quite intense today which then prompted her to present to the ed for further evaluation she denies any recent sick contact, changes in dietary habits, extended travel or chemical/environmental exposures. She denies any other prior symptoms and has been in good health since     ED Course (Summary):   Vitals on presentation: as noted in EMR currently within limits as well and only once had a brief bout of tachycardia  No significant derangement on cbc on presentation  HSTI = <3 x2  BHCG is <2  TSH = 2.98  CTA Chest noted extensive pulmonary emboli on the right without signs of heart strain  Initiated on heparin gtt     7/19: No acute events overnight. Patient reports chest pressure and dyspnea. Patient denies nausea, vomiting, fever, chills, abdominal pain, headache, lightheadedness, chest pain. Labs relatively unremarkable.   CTA showed large saddle pulmonary embolus in the right interlobar pulmonary artery extending into branches of the right middle lobe and lower lobe, nearly occlusive.  No evidence of pulmonary infarct or definite right heart strain. Soft tissue mass in the posterior lower mediastinum along the right  side of the esophagus measuring 3.3 x 4.5 cm of unclear etiology. Malignancy is not excluded although this may represent a benign lesion. Endoscopic biopsy should be considered.  PET/CT could be considered if needed.  TTE showed reduced right ventricular systolic function. Cardiology consult recommendations appreciated. Hematology consult pending. Continue heparin. Anticipate patient will require at least another 24 to 48 hours of inpatient services.       Objective     Last Recorded Vitals  /75 (BP Location: Right arm, Patient Position: Sitting)   Pulse 93   Temp 36.1 °C (96.9 °F) (Temporal)   Resp 16   Wt 83.9 kg (185 lb)   SpO2 97%   Intake/Output last 3 Shifts:    Intake/Output Summary (Last 24 hours) at 7/19/2024 1442  Last data filed at 7/19/2024 1416  Gross per 24 hour   Intake 973.97 ml   Output 0 ml   Net 973.97 ml       Admission Weight  Weight: 83.9 kg (185 lb) (07/19/24 0059)    Daily Weight  07/19/24 : 83.9 kg (185 lb)    Image Results  Transthoracic Echo (TTE) [place] [address]       Phone [phone] Fax [phone]    TRANSTHORACIC ECHOCARDIOGRAM REPORT    Patient Name:      BRIAN MORROW      Reading Physician:    [id number] Tristan Blackwood MD  Study Date:        7/19/2024             Ordering Provider:    [id number] LEYLA LONDON  MRN/PID:           [id number]              Fellow:  Accession#:        [id number]          Nurse:  Date of Birth/Age: 1990 / 34 years  Sonographer:          Bambi Magallon RDCS  Gender:            F                     Additional Staff:  Height:            160.02 cm             Admit Date:           7/18/2024  Weight:            83.91 kg              Admission Status:     Inpatient -                                                                 Routine  BSA /  BMI:         1.87 m2 / 32.77 kg/m2 Department Location:  [address]  Blood Pressure: 119 /75 mmHg    Study Type:    TRANSTHORACIC ECHO (TTE) COMPLETE  Diagnosis/ICD: Saddle embolus of pulmonary artery without acute cor                 pulmonale-I26.92  Indication:    Acute Pulmonary embolism  CPT Codes:     Echo Complete w Full Doppler-54057; Myocardial Strain                 Imaging-[id number]    Patient History:  Pertinent History: PE.    Study Detail: The following Echo studies were performed: 2D, M-Mode, Doppler,                color flow and Strain.       PHYSICIAN INTERPRETATION:  Left Ventricle: The left ventricular systolic function is normal, with a Chiu's biplane calculated ejection fraction of 66%. There are no regional wall motion abnormalities. The left ventricular cavity size is normal. Left Ventricular Global Longitudinal Strain - 19.0 %. Spectral Doppler shows a normal pattern of left ventricular diastolic filling.  Left Atrium: The left atrium is normal in size.  Right Ventricle: The right ventricle is normal in size. There is reduced right ventricular systolic function. RV Strain: GS-17.8%, FWs-20.2%, TAPSE by Strain, 1.9cm.  Right Atrium: The right atrium is normal in size.  Aortic Valve: The aortic valve is trileaflet. There is no evidence of aortic valve stenosis.  There is no evidence of aortic valve regurgitation.  Mitral Valve: The mitral valve is normal in structure. There is trace mitral valve regurgitation.  Tricuspid Valve: The tricuspid valve is structurally normal. There is trace tricuspid regurgitation.  Pulmonic Valve: The pulmonic valve is structurally normal. There is trace pulmonic valve regurgitation.  Pericardium: There is no pericardial effusion noted.  Aorta: The aortic root is normal.  Systemic Veins: The inferior vena cava appears to be of normal size.       CONCLUSIONS:   1. The left ventricular systolic function is normal, with a Chiu's biplane calculated ejection  fraction of 66%.   2. RV Strain: GS-17.8%, FWs-20.2%, TAPSE by Strain, 1.9cm.   3. There is reduced right ventricular systolic function.   4. Aortic valve stenosis is not present.   5. Left Ventricular Global Longitudinal Strain - 19.0 %.    QUANTITATIVE DATA SUMMARY:  2D MEASUREMENTS:                           Normal Ranges:  LAs:           2.81 cm   (2.7-4.0cm)  IVSd:          0.84 cm   (0.6-1.1cm)  LVPWd:         0.68 cm   (0.6-1.1cm)  LVIDd:         4.94 cm   (3.9-5.9cm)  LVIDs:         3.05 cm  LV Mass Index: 66.8 g/m2  LV % FS        38.2 %    LA VOLUME:                                Normal Ranges:  LA Vol A4C:        26.0 ml    (22+/-6mL/m2)  LA Vol A2C:        23.9 ml  LA Vol BP:         27.2 ml  LA Vol Index A4C:  13.9ml/m2  LA Vol Index A2C:  12.8 ml/m2  LA Vol Index BP:   14.5 ml/m2  LA Area A4C:       12.5 cm2  LA Area A2C:       11.0 cm2  LA Major Axis A4C: 5.1 cm  LA Major Axis A2C: 4.3 cm  LA Volume Index:   14.0 ml/m2  LA Vol A4C:        24.1 ml  LA Vol A2C:        22.4 ml    RA VOLUME BY A/L METHOD:                        Normal Ranges:  RA Area A4C: 10.0 cm2    M-MODE MEASUREMENTS:                   Normal Ranges:  AoV Exc: 2.11 cm (1.5-2.5cm)  LAs:     3.03 cm (2.7-4.0cm)    AORTA MEASUREMENTS:                       Normal Ranges:  AoV Exc:     2.11 cm (1.5-2.5cm)  Ao Sinus, d: 3.20 cm (2.1-3.5cm)  Asc Ao, d:   2.50 cm (2.1-3.4cm)    LV SYSTOLIC FUNCTION BY 2D PLANIMETRY (MOD):                                         Normal Ranges:  EF-A4C View:                      66 % (>=55%)  EF-A2C View:                      68 %  EF-Biplane:                       66 %  EF-Auto:                          61 %  LV EF Reported:                   66 %  Global Longitudinal Strain (GLS): 19 %    LV DIASTOLIC FUNCTION:                           Normal Ranges:  MV Peak E:    0.81 m/s   (0.7-1.2 m/s)  MV Peak A:    0.76 m/s   (0.42-0.7 m/s)  E/A Ratio:    1.06       (1.0-2.2)  MV e'         0.127 m/s  (>8.0)  MV  lateral e' 0.17 m/s  MV medial e'  0.09 m/s  MV A Dur:     76.12 msec  E/e' Ratio:   6.34       (<8.0)  LV IVRT:      78 msec    (<100ms)    MITRAL VALVE:                  Normal Ranges:  MV DT: 187 msec (150-240msec)    AORTIC VALVE:                          Normal Ranges:  LVOT Max Raymond:  1.04 m/s (<=1.1m/s)  LVOT VTI:      21.13 cm  LVOT Diameter: 1.91 cm  (1.8-2.4cm)       RIGHT VENTRICLE:  RV Basal 3.09 cm  RV Mid   2.40 cm  RV Major 5.9 cm  TAPSE:   18.7 mm  RV s'    0.09 m/s    TRICUSPID VALVE/RVSP:                              Normal Ranges:  Peak TR Velocity: 2.29 m/s  RV Syst Pressure: 24.1 mmHg (< 30mmHg)  TV E Vmax:        0.48 m/s  (0.3-0.7m/s)  IVC Diam:         0.96 cm    PULMONIC VALVE:                       Normal Ranges:  PV Max Raymond: 0.8 m/s  (0.6-0.9m/s)  PV Max P.5 mmHg    AORTA:  Asc Ao Diam 2.54 cm       [id number] Tristan Blackwood MD  Electronically signed on 2024 at 2:02:08 PM       ** Final **  ECG 12 lead  Sinus rhythm  Baseline wander in lead(s) V5    See ED provider note for full interpretation and clinical correlation  Confirmed by Chelsea Vincent ([id number]) on 2024 1:31:00 PM  CT angio chest for pulmonary embolism  Narrative: STUDY:  CT Angiogram of the Chest; 2024 at 2:53 AM  INDICATION:  Chest pain and shortness of breath.  Evaluate for pulmonary embolism.  COMPARISON:  None available.  ACCESSION NUMBER(S):  [id number]  ORDERING CLINICIAN:  BARRON FERRO  TECHNIQUE:  CTA of the chest was performed with intravenous contrast.   Images are reviewed and processed at a workstation according to the CT  angiogram protocol with 3-D and/or MIP post processing imaging  generated.  Omnipaque 350 75 mL was administered intravenously.  Automated mA/kV exposure control was utilized and patient examination  was performed in strict accordance with principles of ALARA.  FINDINGS:  Pulmonary arteries are adequately opacified with a large saddle  embolus identified in the right interlobar  pulmonary artery extending  into branches of the right middle and lower lobes, nearly occlusive.   The thoracic aorta is normal in course and caliber without dissection  or aneurysm.  There is a soft tissue density in the posterior  mediastinum along the right side of the esophagus measuring 3.3 x 4.5  cm in diameter, seen on axial image 154 of series 4.  The heart is normal in size without pericardial effusion.  There is no  evidence of right heart strain.  Thoracic lymph nodes are not  enlarged.  There is no pleural effusion, pleural thickening, or pneumothorax.   The airways are patent.  Lungs are clear without consolidation, interstitial disease, or  suspicious nodules.  Upper abdomen demonstrates no acute pathology.  There are no acute fractures.  No suspicious bony lesions.  Impression: 1.  Large saddle pulmonary embolus in the right interlobar pulmonary  artery extending into branches of the right middle lobe and lower  lobe, nearly occlusive.  No evidence of pulmonary infarct or definite  right heart strain.  2.  Soft tissue mass in the posterior lower mediastinum along the  right side of the esophagus measuring 3.3 x 4.5 cm of unclear  etiology.  Malignancy is not excluded although this may represent a  benign lesion.  Endoscopic biopsy should be considered.  PET/CT could  be considered if needed.  Critical findings were discussed with   Signed by Jhony Baum          Physical Exam:     General: Alert and oriented x 3, no distress  Cardiovascular: Normal S1-S2, sinus rhythm, no murmurs  Respiratory: Good air entry bilaterally, no obvious wheeze or crackles  Abdomen: Abdomen is soft, not distended with no tenderness  Extremities: No edema or deformities  Neurology: Alert and oriented x 3, no acute focal deficits        Assessment/Plan   #Large Right Saddle Pulmonary Embolism  -continued heparin gtt  -Hematology/Oncology Consult appreciated  -likely transition to Xarelto as she was previously on this without  event  -hypercoagulable workup at the discretion of hematology   7/19:   CTA showed:  Large saddle pulmonary embolus in the right interlobar pulmonary artery extending into branches of the right middle lobe and lower lobe, nearly occlusive.   Soft tissue mass in the posterior lower mediastinum along the right side of the esophagus measuring 3.3 x 4.5 cm of unclear etiology.   Malignancy is not excluded although this may represent a benign lesion.   Endoscopic biopsy should be considered.    PET/CT could be considered if needed.  TTE showed:   Reduced right ventricular systolic function.   Cardiology consult recommendations appreciated.   Hematology consult pending.  Continue heparin.   INR and aPTT pending.   Heparin assay pending.   Repeat CBC pending.   Anticipate patient will require at least another 24 to 48 hours of inpatient services.     #Chronic Migraines  -currently asymptomatic        #Vitamin D deficiency   -continued outpatient follow-up        #Day Time Sleepiness  -states she care of for her two teen children but seems always tired despite sometimes upwards of 10hrs of sleep  -possibly in setting of above worsening this and or underlying sleep apnea  -may benefit from outpatient sleep study at the discretion of PCP           IRINA GUTIÉRREZS    Pt seen and examined  with my PA student . I have modified the note to reflect my documentation of HPI and assessment and plan.    Bridgette Hua MD MRCP

## 2024-07-19 NOTE — CONSULTS
"Inpatient consult to Cardiology  Consult performed by: Lambert Baum MD  Consult ordered by: Shivam Palafox DO        History Of Present Illness:    Chandan Villarreal is a 34 y.o. female  female with Hx of several Pes. She had her 1st PE >10yrs ago during her pregnancy and was on heparin shots (per hematology) and this was discontinued post-partum in 2011. She thinks a few months after (unsure on timing exactly) she developed another clot in her lungs and was on Xarelto starting in 2012 until 2022 when it was discontinued under the direction of hematology (dr. Maya) who she saw on 02/28/22 per outpatient records. She has been doing great since then until about the last week. She stated that she developed worsening shortness of breath and right sided sharp/squeezing chest pain. It became quite intense today which then prompted her to present to the ed for further evaluation she denies any recent sick contact, changes in dietary habits, extended travel or chemical/environmental exposures. She denies any other prior symptoms and has been in good health since      Last Recorded Vitals:  Vitals:    07/19/24 0415 07/19/24 0539 07/19/24 0610 07/19/24 0655   BP: 119/69 112/83 111/75    Pulse: 98 87 88    Resp: 18 15 16    Temp:   36.6 °C (97.9 °F)    TempSrc:   Temporal    SpO2: 98% 96% 97%    Weight:       Height:    1.6 m (5' 3\")       Last Labs:  CBC - 7/19/2024:  1:40 AM  10.4 14.3 275    40.7      CMP - 7/19/2024:  1:40 AM  9.0 6.6 14 --- 0.4   _ 4.1 14 71      PTT - No results in last year.  _   _ _     Troponin I, High Sensitivity   Date/Time Value Ref Range Status   07/19/2024 02:37 AM <3 0 - 13 ng/L Final   07/19/2024 01:40 AM <3 0 - 13 ng/L Final     VLDL   Date/Time Value Ref Range Status   09/30/2020 11:39 AM 14 0 - 40 mg/dL Final      Last I/O:  No intake/output data recorded.    Past Cardiology Tests (Last 3 Years):  EKG:  ECG 12 lead 07/19/2024 (Preliminary)    Echo:  No results found for this or any " "previous visit from the past 1095 days.    Ejection Fractions:  No results found for: \"EF\"  Cath:  No results found for this or any previous visit from the past 1095 days.    Stress Test:  No results found for this or any previous visit from the past 1095 days.    Cardiac Imaging:  No results found for this or any previous visit from the past 1095 days.      Past Medical History:  She has a past medical history of Acute sinusitis (06/13/2023), Chest pain, atypical (06/13/2023), Constipation (06/13/2023), Daily nausea (06/13/2023), Fatigue (06/13/2023), Injury of ankle and foot, left, initial encounter (06/13/2023), Irregular menses (06/13/2023), Pain in unspecified foot (12/17/2019), Personal history of pulmonary embolism (02/04/2021), Seasonal allergies (06/13/2023), and Vaginal discharge (06/13/2023).    Past Surgical History:  She has a past surgical history that includes Other surgical history (09/16/2019) and Other surgical history (12/15/2020).      Social History:  She reports that she has never smoked. She has never used smokeless tobacco. She reports that she does not drink alcohol and does not use drugs.    Family History:  Family History   Problem Relation Name Age of Onset    Cancer Father's Sister      Osteoporosis Father's Sister          Allergies:  Adhesive tape-silicones    Inpatient Medications:  Scheduled medications   Medication Dose Route Frequency     PRN medications   Medication    acetaminophen    heparin    ondansetron     Continuous Medications   Medication Dose Last Rate    heparin  0-4,500 Units/hr 1,500 Units/hr (07/19/24 0550)     Outpatient Medications:  Current Outpatient Medications   Medication Instructions    cholecalciferol (VITAMIN D-3) 125 mcg, oral, Daily    copper (ParaGard T 380A) 380 square mm IUD 1 each, intrauterine, Once    cyanocobalamin (Vitamin B-12) 1,000 mcg tablet 1 tablet, oral, Daily    ergocalciferol (DRISDOL) 1,250 mcg, oral, Once Weekly    magnesium 250 mg, " oral, Daily       Physical Exam:  Vitals and nursing note reviewed.   Constitutional:       General: She is not in acute distress.     Appearance: Normal appearance. She is not ill-appearing or diaphoretic.   HENT:      Head: Normocephalic and atraumatic.      Mouth/Throat:      Mouth: Mucous membranes are moist.      Pharynx: Oropharynx is clear.   Eyes:      General: No scleral icterus.     Extraocular Movements: Extraocular movements intact.      Conjunctiva/sclera: Conjunctivae normal.      Pupils: Pupils are equal, round, and reactive to light.   Neck:      Vascular: No carotid bruit.   Cardiovascular:      Rate and Rhythm: Normal rate and regular rhythm.      Pulses: Normal pulses.      Heart sounds: Normal heart sounds. No murmur heard.  Pulmonary:      Effort: Pulmonary effort is normal. No respiratory distress.      Breath sounds: Normal breath sounds. No wheezing, rhonchi or rales.   Chest:      Chest wall: No tenderness.   Abdominal:      General: Bowel sounds are normal. There is no distension.      Palpations: Abdomen is soft. There is no mass.      Tenderness: There is no abdominal tenderness. There is no right CVA tenderness, left CVA tenderness, guarding or rebound.   Musculoskeletal:         General: Normal range of motion.      Cervical back: Normal range of motion and neck supple. No rigidity or tenderness.      Right lower leg: No edema.      Left lower leg: No edema.   Lymphadenopathy:      Cervical: No cervical adenopathy.   Skin:     General: Skin is warm and dry.      Capillary Refill: Capillary refill takes less than 2 seconds.      Findings: No lesion or rash.   Neurological:      General: No focal deficit present.      Mental Status: She is alert and oriented to person, place, and time. Mental status is at baseline.      Cranial Nerves: No cranial nerve deficit.      Sensory: No sensory deficit.      Motor: No weakness.      Coordination: Coordination normal.      Gait: Gait normal.    Psychiatric:         Mood and Affect: Mood normal.         Behavior: Behavior normal.         Thought Content: Thought content normal.         Judgment: Judgment normal.         Assessment/Plan   1) Acute on chronic PE with no evidence of RV strain  Patient not hypoxic, hypotensive, resting comfortably in bed on room air  Troponin negative  No indication for Thrombectomy currently  Continue Heparin  Further anticoagulation recommendations as per Hematology3  Peripheral IV 07/19/24 18 G Left Antecubital (Active)   Site Assessment Clean;Dry;Intact 07/19/24 0659   Dressing Type Transparent 07/19/24 0659   Line Status Infusing 07/19/24 0659   Dressing Status Clean;Dry;Occlusive 07/19/24 0659   Number of days: 0       Peripheral IV 07/19/24 18 G Right Antecubital (Active)   Site Assessment Clean;Dry;Intact 07/19/24 0659   Dressing Type Transparent 07/19/24 0659   Line Status Infusing 07/19/24 0659   Dressing Status Clean;Dry;Occlusive 07/19/24 0659   Number of days: 0       Code Status:  Full Code    I spent 30 minutes in the professional and overall care of this patient.        Lambert Baum MD

## 2024-07-19 NOTE — PROGRESS NOTES
Social work consult placed for discharge planning/CWI requesting follow up assistance with Hematology. SW reviewed pt's chart and communicated with [facility], requested provider place referral to Hematology. No SW needs foreseen at this time. SW signing off; available upon request.    Sotero Ch, MSW, LSW ([id number])   Care Transitions

## 2024-07-19 NOTE — H&P
[facility] - GENERAL MEDICINE HISTORY AND PHYSICAL    History Obtained From: Patient and Chart Review and Discussion with the ED physician     History Of Present Illness:  Chandan Villarreal is a 34 y.o.  female with several Pes. She had her 1st PE >10yrs ago during her pregnancy and was on heparin shots (per hematology) and this was discontinued post-partum in . She thinks a few months after (unsure on timing exactly) she developed another clot in her lungs and was on Xarelto starting in  until  when it was discontinued under the direction of hematology (dr. Maya) who she saw on 22 per outpatient records. She has been doing great since then until about the last week. She stated that she developed worsening shortness of breath and right sided sharp/squeezing chest pain. It became quite intense today which then prompted her to present to the ed for further evaluation she denies any recent sick contact, changes in dietary habits, extended travel or chemical/environmental exposures. She denies any other prior symptoms and has been in good health since    ED Course (Summary):   Vitals on presentation: as noted in EMR currently within limits as well and only once had a brief bout of tachycardia  No significant derangement on cbc on presentation  HSTI = <3 x2  BHCG is <2  TSH = 2.98  CTA Chest noted extensive pulmonary emboli on the right without signs of heart strain  Initiated on heparin gtt    Past medical history: PEs as described above. . Migraine headaches. BPPV, Lisfranc's sprain, vitamin D deficiency, GERD, appendectomy , ORIF left foot 7/10/2020, hardware removed 2020.  Denies history of other VTE disease, malignancy, MI, CVA or other significant illness.   Family medical history: A young paternal cousin reportedly had intracranial blood clot. Her father may have had varicose veins or a blood clot, but did not have treatment. No other known family history  of VTE disease, early MI, CVA or other thrombotic disorder. Aunt had thyroid cancer. Father may have  of pancreatic cancer, but also had alcohol-related illnesses. Siblings are healthy.     ED Course (From Provider):  ED Course as of 24 I discussed CT with the radiologist.  He states there is a very large right-sided PE that is nearly occlusive.  There is additional incidental finding of soft tissue mass adjacent to the esophagus. [SP]   0318 Given the large clot burden and near occlusive nature of the PE the patient was started on heparin.  She remains hemodynamically stable without evidence of right heart strain.  Troponin is normal. [SP]      ED Course User Index  [SP] Nieves Cantu DO         Diagnoses as of 24   Acute saddle pulmonary embolism without acute cor pulmonale (Multi)     Relevant Results  Results for orders placed or performed during the hospital encounter of 24 (from the past 24 hour(s))   ECG 12 lead   Result Value Ref Range    Ventricular Rate 88 BPM    Atrial Rate 90 BPM    TN Interval 123 ms    QRS Duration 95 ms    QT Interval 364 ms    QTC Calculation(Bazett) 441 ms    P Axis 33 degrees    R Axis 15 degrees    T Axis 21 degrees    QRS Count 14 beats    Q Onset 251 ms    T Offset 433 ms    QTC Fredericia 413 ms   hCG, quantitative, pregnancy   Result Value Ref Range    HCG, Beta-Quantitative <2 <5 mIU/mL   D-dimer, quantitative   Result Value Ref Range    D-Dimer Non VTE, Quant (ng/mL FEU) 3,359 (H) <=500 ng/mL FEU   Magnesium   Result Value Ref Range    Magnesium 2.13 1.60 - 2.40 mg/dL   Troponin I, High Sensitivity, Initial   Result Value Ref Range    Troponin I, High Sensitivity <3 0 - 13 ng/L   CBC and Auto Differential   Result Value Ref Range    WBC 10.4 4.4 - 11.3 x10*3/uL    nRBC 0.0 0.0 - 0.0 /100 WBCs    RBC 4.79 4.00 - 5.20 x10*6/uL    Hemoglobin 14.3 12.0 - 16.0 g/dL    Hematocrit 40.7 36.0 - 46.0 %    MCV 85 80 - 100 fL    MCH  29.9 26.0 - 34.0 pg    MCHC 35.1 32.0 - 36.0 g/dL    RDW 12.6 11.5 - 14.5 %    Platelets 275 150 - 450 x10*3/uL    Neutrophils % 55.2 40.0 - 80.0 %    Immature Granulocytes %, Automated 0.3 0.0 - 0.9 %    Lymphocytes % 32.9 13.0 - 44.0 %    Monocytes % 8.2 2.0 - 10.0 %    Eosinophils % 2.6 0.0 - 6.0 %    Basophils % 0.8 0.0 - 2.0 %    Neutrophils Absolute 5.74 1.20 - 7.70 x10*3/uL    Immature Granulocytes Absolute, Automated 0.03 0.00 - 0.70 x10*3/uL    Lymphocytes Absolute 3.42 1.20 - 4.80 x10*3/uL    Monocytes Absolute 0.85 0.10 - 1.00 x10*3/uL    Eosinophils Absolute 0.27 0.00 - 0.70 x10*3/uL    Basophils Absolute 0.08 0.00 - 0.10 x10*3/uL   Comprehensive metabolic panel   Result Value Ref Range    Glucose 89 74 - 99 mg/dL    Sodium 139 136 - 145 mmol/L    Potassium 3.7 3.5 - 5.3 mmol/L    Chloride 107 98 - 107 mmol/L    Bicarbonate 25 21 - 32 mmol/L    Anion Gap 11 10 - 20 mmol/L    Urea Nitrogen 15 6 - 23 mg/dL    Creatinine 0.74 0.50 - 1.05 mg/dL    eGFR >90 >60 mL/min/1.73m*2    Calcium 9.0 8.6 - 10.3 mg/dL    Albumin 4.1 3.4 - 5.0 g/dL    Alkaline Phosphatase 71 33 - 110 U/L    Total Protein 6.6 6.4 - 8.2 g/dL    AST 14 9 - 39 U/L    Bilirubin, Total 0.4 0.0 - 1.2 mg/dL    ALT 14 7 - 45 U/L   TSH with reflex to Free T4 if abnormal   Result Value Ref Range    Thyroid Stimulating Hormone 2.98 0.44 - 3.98 mIU/L   Troponin, High Sensitivity, 1 Hour   Result Value Ref Range    Troponin I, High Sensitivity <3 0 - 13 ng/L      ECG 12 lead    Result Date: 7/19/2024  Sinus rhythm Baseline wander in lead(s) V5    CT angio chest for pulmonary embolism    Result Date: 7/19/2024  STUDY: CT Angiogram of the Chest; 7/19/2024 at 2:53 AM INDICATION: Chest pain and shortness of breath.  Evaluate for pulmonary embolism. COMPARISON: None available. ACCESSION NUMBER(S): [id number] ORDERING CLINICIAN: BARRON FERRO TECHNIQUE:  CTA of the chest was performed with intravenous contrast. Images are reviewed and processed at a  workstation according to the CT angiogram protocol with 3-D and/or MIP post processing imaging generated.  Omnipaque 350 75 mL was administered intravenously. Automated mA/kV exposure control was utilized and patient examination was performed in strict accordance with principles of ALARA. FINDINGS: Pulmonary arteries are adequately opacified with a large saddle embolus identified in the right interlobar pulmonary artery extending into branches of the right middle and lower lobes, nearly occlusive. The thoracic aorta is normal in course and caliber without dissection or aneurysm.  There is a soft tissue density in the posterior mediastinum along the right side of the esophagus measuring 3.3 x 4.5 cm in diameter, seen on axial image 154 of series 4. The heart is normal in size without pericardial effusion.  There is no evidence of right heart strain.  Thoracic lymph nodes are not enlarged. There is no pleural effusion, pleural thickening, or pneumothorax. The airways are patent. Lungs are clear without consolidation, interstitial disease, or suspicious nodules. Upper abdomen demonstrates no acute pathology. There are no acute fractures.  No suspicious bony lesions.    1.  Large saddle pulmonary embolus in the right interlobar pulmonary artery extending into branches of the right middle lobe and lower lobe, nearly occlusive.  No evidence of pulmonary infarct or definite right heart strain. 2.  Soft tissue mass in the posterior lower mediastinum along the right side of the esophagus measuring 3.3 x 4.5 cm of unclear etiology.  Malignancy is not excluded although this may represent a benign lesion.  Endoscopic biopsy should be considered.  PET/CT could be considered if needed. Critical findings were discussed with Signed by Jhony Baum    Scheduled medications:     Continuous medications:  heparin, 0-4,500 Units/hr, Last Rate: 1,500 Units/hr (07/19/24 0541)      PRN medications:  PRN medications: acetaminophen, heparin,  ondansetron     Past Medical History  She has a past medical history of Acute sinusitis (06/13/2023), Chest pain, atypical (06/13/2023), Constipation (06/13/2023), Daily nausea (06/13/2023), Fatigue (06/13/2023), Injury of ankle and foot, left, initial encounter (06/13/2023), Irregular menses (06/13/2023), Pain in unspecified foot (12/17/2019), Personal history of pulmonary embolism (02/04/2021), Seasonal allergies (06/13/2023), and Vaginal discharge (06/13/2023).    Surgical History  She has a past surgical history that includes Other surgical history (09/16/2019) and Other surgical history (12/15/2020).     Social History  She reports that she has never smoked. She has never used smokeless tobacco. She reports that she does not drink alcohol and does not use drugs.    Family History  Family History   Problem Relation Name Age of Onset    Cancer Father's Sister      Osteoporosis Father's Sister         Allergies  Adhesive tape-silicones    Code Status  Full Code     Review of Systems   Constitutional:  Positive for activity change and fatigue. Negative for appetite change, chills, diaphoresis, fever and unexpected weight change.   HENT:  Negative for congestion, dental problem, drooling, facial swelling, hearing loss, mouth sores, nosebleeds, postnasal drip, rhinorrhea, sinus pressure, sinus pain, sneezing, sore throat, tinnitus, trouble swallowing and voice change.    Eyes:  Negative for photophobia and visual disturbance.   Respiratory:  Negative for cough, chest tightness, shortness of breath and wheezing.    Cardiovascular:  Positive for chest pain. Negative for palpitations and leg swelling.   Gastrointestinal:  Negative for abdominal distention, abdominal pain, blood in stool, constipation, diarrhea, nausea, rectal pain and vomiting.   Endocrine: Negative for cold intolerance, heat intolerance, polydipsia, polyphagia and polyuria.   Genitourinary:  Negative for decreased urine volume, difficulty urinating,  dysuria, flank pain, frequency, hematuria, urgency and vaginal bleeding.   Musculoskeletal:  Negative for arthralgias, back pain, gait problem, joint swelling, myalgias, neck pain and neck stiffness.   Skin:  Negative for color change, rash and wound.   Allergic/Immunologic: Negative for immunocompromised state.   Neurological:  Negative for dizziness, tremors, seizures, syncope, facial asymmetry, speech difficulty, weakness, light-headedness, numbness and headaches.   Hematological:  Negative for adenopathy. Does not bruise/bleed easily.   Psychiatric/Behavioral:  Negative for confusion and decreased concentration. The patient is not nervous/anxious.    All other systems reviewed and are negative.      Last Recorded Vitals  /75   Pulse 88   Temp 36.6 °C (97.9 °F) (Temporal)   Resp 16   Wt 83.9 kg (185 lb)   SpO2 97%      Physical Exam  Vitals and nursing note reviewed.   Constitutional:       General: She is not in acute distress.     Appearance: Normal appearance. She is not ill-appearing or diaphoretic.   HENT:      Head: Normocephalic and atraumatic.      Mouth/Throat:      Mouth: Mucous membranes are moist.      Pharynx: Oropharynx is clear.   Eyes:      General: No scleral icterus.     Extraocular Movements: Extraocular movements intact.      Conjunctiva/sclera: Conjunctivae normal.      Pupils: Pupils are equal, round, and reactive to light.   Neck:      Vascular: No carotid bruit.   Cardiovascular:      Rate and Rhythm: Normal rate and regular rhythm.      Pulses: Normal pulses.      Heart sounds: Normal heart sounds. No murmur heard.  Pulmonary:      Effort: Pulmonary effort is normal. No respiratory distress.      Breath sounds: Normal breath sounds. No wheezing, rhonchi or rales.   Chest:      Chest wall: No tenderness.   Abdominal:      General: Bowel sounds are normal. There is no distension.      Palpations: Abdomen is soft. There is no mass.      Tenderness: There is no abdominal  tenderness. There is no right CVA tenderness, left CVA tenderness, guarding or rebound.   Musculoskeletal:         General: Normal range of motion.      Cervical back: Normal range of motion and neck supple. No rigidity or tenderness.      Right lower leg: No edema.      Left lower leg: No edema.   Lymphadenopathy:      Cervical: No cervical adenopathy.   Skin:     General: Skin is warm and dry.      Capillary Refill: Capillary refill takes less than 2 seconds.      Findings: No lesion or rash.   Neurological:      General: No focal deficit present.      Mental Status: She is alert and oriented to person, place, and time. Mental status is at baseline.      Cranial Nerves: No cranial nerve deficit.      Sensory: No sensory deficit.      Motor: No weakness.      Coordination: Coordination normal.      Gait: Gait normal.   Psychiatric:         Mood and Affect: Mood normal.         Behavior: Behavior normal.         Thought Content: Thought content normal.         Judgment: Judgment normal.       Assessment/Plan   Principal Problem:    Acute saddle pulmonary embolism without acute cor pulmonale (Multi)    Large Right Saddle Pulmonary Embolism  -continued heparin gtt  -Hematology/Oncology Consult appreciated  -likely transition to Xarelto as she was previously on this without event  -hypercoagulable workup at the discretion of hematology    Chronic Migraines  -currently asymptomatic     Vitamin D deficiency   -continued outpatient follow-up     Day Time Sleepiness  -states she care of for her two teen children but seems always tired despite sometimes upwards of 10hrs of sleep  -possibly in setting of above worsening this and or underlying sleep apnea  -may benefit from outpatient sleep study at the discretion of PCP     Code Status: Full Code      DO Lamar Cassidy dictation software was used to dictate this note and thus there may be minor errors in translation/transcription including garbled speech or  misspellings. Please contact for clarification if needed.

## 2024-07-19 NOTE — PROGRESS NOTES
07/19/24 1300   Discharge Planning   Living Arrangements Spouse/significant other   Support Systems Spouse/significant other   Assistance Needed Independent   Type of Residence Private residence   Home or Post Acute Services None   Expected Discharge Disposition Home   Does the patient need discharge transport arranged? No   Financial Resource Strain   How hard is it for you to pay for the very basics like food, housing, medical care, and heating? Not hard   Housing Stability   In the last 12 months, was there a time when you were not able to pay the mortgage or rent on time? N   At any time in the past 12 months, were you homeless or living in a shelter (including now)? N   Transportation Needs   In the past 12 months, has lack of transportation kept you from medical appointments or from getting medications? no   In the past 12 months, has lack of transportation kept you from meetings, work, or from getting things needed for daily living? No     Tamara Knight MD Patient is from home with her boyfriend. Patient is independent with ambulation, self care, driving, shopping, and meals. Patient plans to return home with no needs upon discharge. [facility] will continue to follow for needs if they arise.

## 2024-07-19 NOTE — ED TRIAGE NOTES
Pt to ED c/o CP, states started a couple of days ago, moving makes it worse, rest makes it better. Denies radation, denies SOB, N/V. Pt states feel like pressure. Pt A&Ox4.

## 2024-07-19 NOTE — CARE PLAN
The patient's goals for the shift include      The clinical goals for the shift include pt will remain free from SOB or chest pain      Problem: Respiratory  Goal: Clear secretions with interventions this shift  Outcome: Progressing  Goal: Minimize anxiety/maximize coping throughout shift  Outcome: Progressing  Goal: Minimal/no exertional discomfort or dyspnea this shift  Outcome: Progressing  Goal: No signs of respiratory distress (eg. Use of accessory muscles. Peds grunting)  Outcome: Progressing  Goal: Patent airway maintained this shift  Outcome: Progressing  Goal: Tolerate mechanical ventilation evidenced by VS/agitation level this shift  Outcome: Progressing  Goal: Tolerate pulmonary toileting this shift  Outcome: Progressing  Goal: Verbalize decreased shortness of breath this shift  Outcome: Progressing  Goal: Wean oxygen to maintain O2 saturation per order/standard this shift  Outcome: Progressing  Goal: Increase self care and/or family involvement in next 24 hours  Outcome: Progressing     Problem: Pain - Adult  Goal: Verbalizes/displays adequate comfort level or baseline comfort level  Outcome: Progressing     Problem: Safety - Adult  Goal: Free from fall injury  Outcome: Progressing     Problem: Discharge Planning  Goal: Discharge to home or other facility with appropriate resources  Outcome: Progressing     Problem: Chronic Conditions and Co-morbidities  Goal: Patient's chronic conditions and co-morbidity symptoms are monitored and maintained or improved  Outcome: Progressing

## 2024-07-20 ENCOUNTER — PHARMACY VISIT (OUTPATIENT)
Dept: PHARMACY | Facility: CLINIC | Age: 34
End: 2024-07-20
Payer: MEDICAID

## 2024-07-20 VITALS
HEIGHT: 63 IN | WEIGHT: 185 LBS | RESPIRATION RATE: 16 BRPM | DIASTOLIC BLOOD PRESSURE: 81 MMHG | BODY MASS INDEX: 32.78 KG/M2 | TEMPERATURE: 97.6 F | HEART RATE: 97 BPM | OXYGEN SATURATION: 95 % | SYSTOLIC BLOOD PRESSURE: 119 MMHG

## 2024-07-20 PROBLEM — Z97.5 USES INTRAUTERINE DEVICE FOR BIRTH CONTROL: Status: ACTIVE | Noted: 2024-07-20

## 2024-07-20 PROBLEM — H81.10 BENIGN PAROXYSMAL POSITIONAL VERTIGO: Status: ACTIVE | Noted: 2024-07-20

## 2024-07-20 LAB — UFH PPP CHRO-ACNC: 0.8 IU/ML

## 2024-07-20 PROCEDURE — G0378 HOSPITAL OBSERVATION PER HR: HCPCS

## 2024-07-20 PROCEDURE — 85520 HEPARIN ASSAY: CPT | Performed by: EMERGENCY MEDICINE

## 2024-07-20 PROCEDURE — RXMED WILLOW AMBULATORY MEDICATION CHARGE

## 2024-07-20 PROCEDURE — 2500000001 HC RX 250 WO HCPCS SELF ADMINISTERED DRUGS (ALT 637 FOR MEDICARE OP): Performed by: INTERNAL MEDICINE

## 2024-07-20 PROCEDURE — 99239 HOSP IP/OBS DSCHRG MGMT >30: CPT | Performed by: INTERNAL MEDICINE

## 2024-07-20 PROCEDURE — 36415 COLL VENOUS BLD VENIPUNCTURE: CPT | Performed by: EMERGENCY MEDICINE

## 2024-07-20 RX ADMIN — ACETAMINOPHEN 650 MG: 325 TABLET ORAL at 10:14

## 2024-07-20 RX ADMIN — APIXABAN 10 MG: 5 TABLET, FILM COATED ORAL at 10:11

## 2024-07-20 ASSESSMENT — COGNITIVE AND FUNCTIONAL STATUS - GENERAL
DAILY ACTIVITIY SCORE: 24
MOBILITY SCORE: 24

## 2024-07-20 ASSESSMENT — PAIN DESCRIPTION - LOCATION: LOCATION: HEAD

## 2024-07-20 ASSESSMENT — PAIN - FUNCTIONAL ASSESSMENT: PAIN_FUNCTIONAL_ASSESSMENT: 0-10

## 2024-07-20 ASSESSMENT — PAIN SCALES - GENERAL
PAINLEVEL_OUTOF10: 0 - NO PAIN
PAINLEVEL_OUTOF10: 0 - NO PAIN
PAINLEVEL_OUTOF10: 3
PAINLEVEL_OUTOF10: 0 - NO PAIN

## 2024-07-20 NOTE — CARE PLAN
The patient's goals for the shift include  pt will remain free of injury this shift    The clinical goals for the shift include pt will remain free from SOB or chest pain throughout the shift.

## 2024-07-20 NOTE — CARE PLAN
The patient's goals for the shift include      The clinical goals for the shift include pt will remain safe and free from injuries during this shift      Problem: Respiratory  Goal: Clear secretions with interventions this shift  Outcome: Progressing  Goal: Minimize anxiety/maximize coping throughout shift  Outcome: Progressing  Goal: Minimal/no exertional discomfort or dyspnea this shift  Outcome: Progressing  Goal: No signs of respiratory distress (eg. Use of accessory muscles. Peds grunting)  Outcome: Progressing  Goal: Patent airway maintained this shift  Outcome: Progressing  Goal: Tolerate mechanical ventilation evidenced by VS/agitation level this shift  Outcome: Progressing  Goal: Tolerate pulmonary toileting this shift  Outcome: Progressing  Goal: Verbalize decreased shortness of breath this shift  Outcome: Progressing  Goal: Wean oxygen to maintain O2 saturation per order/standard this shift  Outcome: Progressing  Goal: Increase self care and/or family involvement in next 24 hours  Outcome: Progressing     Problem: Pain - Adult  Goal: Verbalizes/displays adequate comfort level or baseline comfort level  Outcome: Progressing     Problem: Safety - Adult  Goal: Free from fall injury  Outcome: Progressing     Problem: Discharge Planning  Goal: Discharge to home or other facility with appropriate resources  Outcome: Progressing     Problem: Chronic Conditions and Co-morbidities  Goal: Patient's chronic conditions and co-morbidity symptoms are monitored and maintained or improved  Outcome: Progressing     Problem: Pain  Goal: Takes deep breaths with improved pain control throughout the shift  Outcome: Progressing  Goal: Turns in bed with improved pain control throughout the shift  Outcome: Progressing  Goal: Walks with improved pain control throughout the shift  Outcome: Progressing  Goal: Performs ADL's with improved pain control throughout shift  Outcome: Progressing  Goal: Participates in PT with improved  pain control throughout the shift  Outcome: Progressing  Goal: Free from opioid side effects throughout the shift  Outcome: Progressing  Goal: Free from acute confusion related to pain meds throughout the shift  Outcome: Progressing   Pt resting comfortably in bed

## 2024-07-20 NOTE — DISCHARGE SUMMARY
Discharge Diagnosis  Acute saddle pulmonary embolism without acute cor pulmonale (Multi)  History of DVTs post pregnancy  Chronic migraines  Vitamin deficiency  BMI of 32.77    Issues Requiring Follow-Up  Continue anticoagulation on discharge  Follow-up with PCP in 1 week's time  Follow-up in the hematology clinic in 2 to 3  weeks time.    Discharge Meds     Your medication list        START taking these medications        Instructions Last Dose Given Next Dose Due   apixaban 5 mg (74 tabs) tablet  Commonly known as: Eliquis  Start taking on: July 20, 2024      Take 2 tablets (10 mg) by mouth 2 times a day for 7 days, THEN 1 tablet (5 mg) 2 times a day.              CONTINUE taking these medications        Instructions Last Dose Given Next Dose Due   cholecalciferol 125 MCG (5000 UT) capsule  Commonly known as: Vitamin D-3           cyanocobalamin 1,000 mcg tablet  Commonly known as: Vitamin B-12           ergocalciferol 1.25 MG (21681 UT) capsule  Commonly known as: DrisdoL      Take 1 capsule (1,250 mcg) by mouth 1 (one) time per week.       ParaGard T 380A 380 square mm IUD  Generic drug: copper                  ASK your doctor about these medications        Instructions Last Dose Given Next Dose Due   magnesium 250 mg tablet      Take 1 tablet (250 mg) by mouth once daily.                 Where to Get Your Medications        These medications were sent to [facility]  [address] [place], [place] [id number]      Hours: 8AM to 6PM Mon-Fri, 8AM to 4PM Sat-Sun Phone: [phone]   apixaban 5 mg (74 tabs) tablet         Test Results Pending At Discharge  Pending Labs       No current pending labs.            Hospital Course     Chandan Villarreal is a 34 y.o.  female with several Pes. She had her 1st PE >10yrs ago during her pregnancy and was on heparin shots (per hematology) and this was discontinued post-partum in 2011. She thinks a few months after (unsure on timing exactly) she developed  another clot in her lungs and was on Xarelto starting in 2012 until 2022 when it was discontinued under the direction of hematology (dr. Maya) who she saw on 02/28/22 per outpatient records. She has been doing great since then until about the last week. She stated that she developed worsening shortness of breath and right sided sharp/squeezing chest pain. It became quite intense today which then prompted her to present to the ed for further evaluation she denies any recent sick contact, changes in dietary habits, extended travel or chemical/environmental exposures. She denies any other prior symptoms and has been in good health since     ED Course (Summary):   Vitals on presentation: as noted in EMR currently within limits as well and only once had a brief bout of tachycardia  No significant derangement on cbc on presentation  HSTI = <3 x2  BHCG is <2  TSH = 2.98  CTA Chest noted extensive pulmonary emboli on the right without signs of heart strain  Initiated on heparin gtt        7/19: No acute events overnight. Patient reports chest pressure and dyspnea. Patient denies nausea, vomiting, fever, chills, abdominal pain, headache, lightheadedness, chest pain. Labs relatively unremarkable.   CTA showed large saddle pulmonary embolus in the right interlobar pulmonary artery extending into branches of the right middle lobe and lower lobe, nearly occlusive.  No evidence of pulmonary infarct or definite right heart strain. Soft tissue mass in the posterior lower mediastinum along the right side of the esophagus measuring 3.3 x 4.5 cm of unclear etiology. Malignancy is not excluded although this may represent a benign lesion. Endoscopic biopsy should be considered.  PET/CT could be considered if needed.  TTE showed reduced right ventricular systolic function. Cardiology consult recommendations appreciated. Hematology consult pending. Continue heparin. Anticipate patient will require at least another 24 to 48 hours of  inpatient services.  7/20: No acute events overnight.  Echo showed no features of right-sided heart failure.  Patient denies any shortness of breath, chest pain, palpitation or lightheadedness.  Cardiology recommendations appreciated.  Will plan to discharge home on Eliquis.  Patient is to follow with PCP in 1 week's time.  Patient is to follow in the hematology clinic in 2 to 3 weeks time.    Pertinent Physical Exam At Time of Discharge  Physical Exam  CONSTITUTIONAL - alert, in no acute distress, not ill-appearing  CHEST - clear to auscultation, no wheezing, no crackles and no rales, good effort  CARDIAC - regular rate and regular rhythm, no murmur  ABDOMEN - no organomegaly, soft, nontender, nondistended, normal bowel sounds, no guarding/rebound/rigidity  EXTREMITIES - no edema, no deformities  NEUROLOGICAL - alert, oriented x3 and no acute focal signs  PSYCHIATRIC - alert, pleasant and cordial, age-appropriate    Outpatient Follow-Up  Future Appointments   Date Time Provider Department Center   2/13/2025  1:40 PM Tamara Knight MD [id number]       Discharge planning took more than 30 minutes.    Bridgette Hua MD

## 2024-07-20 NOTE — PROGRESS NOTES
History Of Present Illness:    Chandan Villarreal is a 34 y.o. female  female with Hx of several Pes. She had her 1st PE >10yrs ago during her pregnancy and was on heparin shots (per hematology) and this was discontinued post-partum in 2011. She thinks a few months after (unsure on timing exactly) she developed another clot in her lungs and was on Xarelto starting in 2012 until 2022 when it was discontinued under the direction of hematology (dr. Maya) who she saw on 02/28/22 per outpatient records. She has been doing great since then until about the last week. She stated that she developed worsening shortness of breath and right sided sharp/squeezing chest pain. It became quite intense today which then prompted her to present to the ed for further evaluation she denies any recent sick contact, changes in dietary habits, extended travel or chemical/environmental exposures. She denies any other prior symptoms and has been in good health since      Objective Data:  Last Recorded Vitals:  Vitals:    07/20/24 0159 07/20/24 0434 07/20/24 0936 07/20/24 1311   BP: 108/63 117/79 125/70 119/81   BP Location:  Right arm Right arm Right arm   Patient Position: Lying Sitting Lying Lying   Pulse: 93 78 98 97   Resp: 16 16 16 16   Temp: 36 °C (96.8 °F) 35.7 °C (96.3 °F) 36.1 °C (96.9 °F) 36.4 °C (97.6 °F)   TempSrc: Temporal Temporal Temporal Temporal   SpO2: 97% 97% 98% 95%   Weight:       Height:           Last Labs:  CBC - 7/19/2024:  1:40 AM  10.4 14.3 275    40.7      CMP - 7/19/2024:  1:40 AM  9.0 6.6 14 --- 0.4   _ 4.1 14 71      PTT - No results in last year.  _   _ _     TROPHS   Date/Time Value Ref Range Status   07/19/2024 02:37 AM <3 0 - 13 ng/L Final   07/19/2024 01:40 AM <3 0 - 13 ng/L Final     VLDL   Date/Time Value Ref Range Status   09/30/2020 11:39 AM 14 0 - 40 mg/dL Final      Last I/O:      Past Cardiology Tests (Last 3 Years):  EKG:  ECG 12 lead 07/19/2024    Echo:  Transthoracic Echo (TTE) Complete  07/19/2024  CONCLUSIONS:   1. The left ventricular systolic function is normal, with a Chiu's biplane calculated ejection fraction of 66%.   2. RV Strain: GS-17.8%, FWs-20.2%, TAPSE by Strain, 1.9cm.   3. There is reduced right ventricular systolic function.   4. Aortic valve stenosis is not present.   5. Left Ventricular Global Longitudinal Strain - 19.0 %.     Ejection Fractions:  EF   Date/Time Value Ref Range Status   07/19/2024 11:58 AM 66 %      Cath:  No results found for this or any previous visit from the past 1095 days.    Stress Test:  No results found for this or any previous visit from the past 1095 days.    Cardiac Imaging:  No results found for this or any previous visit from the past 1095 days.      Inpatient Medications:  Scheduled medications   Medication Dose Route Frequency    apixaban  10 mg oral BID    Followed by    [START ON 7/27/2024] apixaban  5 mg oral BID     PRN medications   Medication    acetaminophen    morphine    ondansetron     Continuous Medications   Medication Dose Last Rate      Assessment/Plan   1) Acute on chronic PE with no evidence of RV strain  Patient not hypoxic, hypotensive, resting comfortably in bed on room air  Troponin negative  No indication for Thrombectomy currently  Continue Heparin  Further anticoagulation recommendations as per Hematology3    7/20/24:  Chart reviewed.  Will sign off this case.   Please let cardiology know if patient needs to be reevaluated from cardiac standpoint.     Code Status:  Full Code    I spent 5 minutes in the professional and overall care of this patient.        Aimee Buckley, APRN-CNP

## 2024-07-20 NOTE — NURSING NOTE
Patient discharged home with spouse. Patient given home going instructions and Eliquis prescription. Patient has no further questions at this time.

## 2024-07-22 ENCOUNTER — PATIENT OUTREACH (OUTPATIENT)
Dept: PRIMARY CARE | Facility: CLINIC | Age: 34
End: 2024-07-22
Payer: COMMERCIAL

## 2024-07-22 ENCOUNTER — TELEPHONE (OUTPATIENT)
Dept: PRIMARY CARE | Facility: CLINIC | Age: 34
End: 2024-07-22
Payer: COMMERCIAL

## 2024-07-22 NOTE — TELEPHONE ENCOUNTER
----- Message from Gil Pantoja sent at 7/22/2024  2:35 PM EDT -----  Regarding: Unsuccessful discharge outreach after 2 attempts.  Discharge facility: [place]  Discharge diagnosis:  Acute saddle pulmonary embolism without acute cor pulmonale (Multi)  History of DVTs post pregnancy  Chronic migraines  Vitamin deficiency  BMI of 32.77     Date of discharge: 20 July 24       Unsuccessful attempts x2 to reach patient for PCP Follow-up  Please have office staff reach out to patient and schedule an appointment within 14 days from discharge date.

## 2024-07-22 NOTE — PROGRESS NOTES
[facility]: [facility]  Discharge Diagnosis:  Acute saddle pulmonary embolism without acute cor pulmonale (Multi)  History of DVTs post pregnancy  Chronic migraines  Vitamin deficiency  BMI of 32.77   Admission Date: 19 July 24  Discharge Date: 20 July 24    PCP Appointment Date: Tasked to office for scheduling  Specialist Appointment Date: N/A  Hospital Encounter and Summary Linked: Yes    Unsuccessful discharge outreach after 2 attempts. Tasked to office for scheduling. Will attempt outreach again in 2 wks.

## 2024-07-29 ENCOUNTER — APPOINTMENT (OUTPATIENT)
Dept: PRIMARY CARE | Facility: CLINIC | Age: 34
End: 2024-07-29
Payer: COMMERCIAL

## 2024-07-29 VITALS
SYSTOLIC BLOOD PRESSURE: 110 MMHG | OXYGEN SATURATION: 98 % | HEART RATE: 94 BPM | HEIGHT: 63 IN | BODY MASS INDEX: 33.13 KG/M2 | DIASTOLIC BLOOD PRESSURE: 68 MMHG | WEIGHT: 187 LBS

## 2024-07-29 DIAGNOSIS — I26.92 ACUTE SADDLE PULMONARY EMBOLISM WITHOUT ACUTE COR PULMONALE (MULTI): ICD-10-CM

## 2024-07-29 DIAGNOSIS — K22.89 ESOPHAGEAL MASS: Primary | ICD-10-CM

## 2024-07-29 PROCEDURE — 99495 TRANSJ CARE MGMT MOD F2F 14D: CPT

## 2024-07-29 PROCEDURE — 3008F BODY MASS INDEX DOCD: CPT

## 2024-07-29 PROCEDURE — 1036F TOBACCO NON-USER: CPT

## 2024-07-29 ASSESSMENT — ENCOUNTER SYMPTOMS
CONSTITUTIONAL NEGATIVE: 1
RESPIRATORY NEGATIVE: 1
MUSCULOSKELETAL NEGATIVE: 1
EYES NEGATIVE: 1
CARDIOVASCULAR NEGATIVE: 1
NEUROLOGICAL NEGATIVE: 1
HEMATOLOGIC/LYMPHATIC NEGATIVE: 1
OCCASIONAL FEELINGS OF UNSTEADINESS: 0
ENDOCRINE NEGATIVE: 1
LOSS OF SENSATION IN FEET: 0
ALLERGIC/IMMUNOLOGIC NEGATIVE: 1
DEPRESSION: 0
GASTROINTESTINAL NEGATIVE: 1
PSYCHIATRIC NEGATIVE: 1

## 2024-07-29 ASSESSMENT — PATIENT HEALTH QUESTIONNAIRE - PHQ9
1. LITTLE INTEREST OR PLEASURE IN DOING THINGS: NOT AT ALL
2. FEELING DOWN, DEPRESSED OR HOPELESS: NOT AT ALL
SUM OF ALL RESPONSES TO PHQ9 QUESTIONS 1 AND 2: 0

## 2024-07-29 NOTE — PATIENT INSTRUCTIONS
Referral sent to general surgery to evaluate  esophageal.  Monitor of signs and symptoms bleeding, avoid NSAIDS.    Assessment/Plan   Problem List Items Addressed This Visit             ICD-10-CM    Acute saddle pulmonary embolism without acute cor pulmonale (Multi) I26.92    Relevant Medications    apixaban (Eliquis) 5 mg (74 tabs) tablet     Other Visit Diagnoses         Codes    Esophageal mass    -  Primary K22.89    Relevant Orders    Referral to General Surgery

## 2024-07-29 NOTE — PROGRESS NOTES
"Patient: Chandan Villarreal  : 1990  PCP: Tamara Knight MD  MRN: [id number]  Program: Transitional Care Management  Status: Enrolled  Effective Dates: 2024 - present  Responsible Staff: Gil Pantoja RN  Social Determinants to be Addressed: No information to display         Chandan Villarreal is a 34 y.o. female presenting today for follow-up after being discharged from the hospital 9 days ago. The main problem requiring admission was Acute saddle pulmonary embolism without acute cor pulmonale (Multi). The discharge summary and/or Transitional Care Management documentation was reviewed. Medication reconciliation was performed as indicated via the \"[place] as Reviewed\" timestamp.     Chandan Villarreal was contacted by Transitional Care Management services two days after her discharge. This encounter and supporting documentation was reviewed.    Patient in office for follow up after hospital stay for saddle pulmonary embolism. Had blood clot while pregnant and was on xarelto for about 10 years, then stopped about 2 years ago.  Is currently on Elquis 5mg once in AM and PM. Has follow up with hematology but not until November, will refill for patient.  Discussed with patient likely kilpatrick of being on this for lifetime. Discussed s/s of bleeding to monitor for. Avoiding NSAIDS.   Denies chest pain, tightness or shortness of breath.       Review of Systems   Constitutional: Negative.    HENT: Negative.     Eyes: Negative.    Respiratory: Negative.     Cardiovascular: Negative.    Gastrointestinal: Negative.    Endocrine: Negative.    Genitourinary: Negative.    Musculoskeletal: Negative.    Skin: Negative.    Allergic/Immunologic: Negative.    Neurological: Negative.    Hematological: Negative.    Psychiatric/Behavioral: Negative.     All other systems reviewed and are negative.      /68   Pulse 94   Ht 1.6 m (5' 2.99\")   Wt 84.8 kg (187 lb)   SpO2 98%   BMI 33.13 kg/m²     Physical Exam  Constitutional: "       Appearance: Normal appearance.   HENT:      Head: Normocephalic and atraumatic.      Nose: Nose normal.      Mouth/Throat:      Mouth: Mucous membranes are moist.      Pharynx: Oropharynx is clear.   Eyes:      Pupils: Pupils are equal, round, and reactive to light.   Cardiovascular:      Rate and Rhythm: Normal rate and regular rhythm.      Pulses: Normal pulses.      Heart sounds: Normal heart sounds.   Pulmonary:      Effort: Pulmonary effort is normal.      Breath sounds: Normal breath sounds.   Abdominal:      General: Bowel sounds are normal.      Palpations: Abdomen is soft.   Musculoskeletal:         General: Normal range of motion.      Cervical back: Normal range of motion.   Skin:     General: Skin is warm and dry.   Neurological:      General: No focal deficit present.      Mental Status: She is alert and oriented to person, place, and time.   Psychiatric:         Mood and Affect: Mood normal.         Behavior: Behavior normal.         Thought Content: Thought content normal.         Judgment: Judgment normal.         The complexity of medical decision making for this patient's transitional care is moderate.    Assessment/Plan   Problem List Items Addressed This Visit             ICD-10-CM    Acute saddle pulmonary embolism without acute cor pulmonale (Multi) I26.92    Relevant Medications    apixaban (Eliquis) 5 mg (74 tabs) tablet     Other Visit Diagnoses         Codes    Esophageal mass    -  Primary K22.89    Relevant Orders    Referral to General Surgery          Discussed incidental finding on the CT scan with patient of soft tissue mass in the posterior lower mediastinum along the right side of the esophagus measuring 3.3 x 4.5 cm of unclear etiology. Will refer patient to general surgery to follow up on area.

## 2024-08-06 ENCOUNTER — PATIENT OUTREACH (OUTPATIENT)
Dept: PRIMARY CARE | Facility: CLINIC | Age: 34
End: 2024-08-06
Payer: COMMERCIAL

## 2024-08-06 NOTE — PROGRESS NOTES
Unable to reach patient for call back after patient's follow up appointment with PCP.   KARENM with call back number for patient to call if needed   If no voicemail available call attempts x 2 were made to contact the patient to assist with any questions or concerns patient may have.

## 2024-08-09 ENCOUNTER — TELEPHONE (OUTPATIENT)
Dept: SURGERY | Facility: CLINIC | Age: 34
End: 2024-08-09
Payer: COMMERCIAL

## 2024-08-09 DIAGNOSIS — J98.59 MEDIASTINAL MASS: Primary | ICD-10-CM

## 2024-08-09 NOTE — TELEPHONE ENCOUNTER
Referred patient to Henrry diaz (Thoracic surgeron) per dr. Carver. Canceled patients appt on 8/13/2024 and left phone number for new provider.

## 2024-08-12 PROBLEM — O22.30 DVT (DEEP VEIN THROMBOSIS) IN PREGNANCY (HHS-HCC): Status: ACTIVE | Noted: 2024-08-12

## 2024-08-13 ENCOUNTER — APPOINTMENT (OUTPATIENT)
Dept: SURGERY | Facility: CLINIC | Age: 34
End: 2024-08-13
Payer: COMMERCIAL

## 2024-08-14 ENCOUNTER — OFFICE VISIT (OUTPATIENT)
Dept: SURGERY | Facility: HOSPITAL | Age: 34
End: 2024-08-14
Payer: COMMERCIAL

## 2024-08-14 VITALS
DIASTOLIC BLOOD PRESSURE: 74 MMHG | OXYGEN SATURATION: 99 % | HEART RATE: 85 BPM | TEMPERATURE: 96.1 F | WEIGHT: 185.63 LBS | RESPIRATION RATE: 18 BRPM | BODY MASS INDEX: 32.89 KG/M2 | SYSTOLIC BLOOD PRESSURE: 113 MMHG

## 2024-08-14 DIAGNOSIS — J98.59 MEDIASTINAL MASS: ICD-10-CM

## 2024-08-14 PROCEDURE — 99215 OFFICE O/P EST HI 40 MIN: CPT | Performed by: THORACIC SURGERY (CARDIOTHORACIC VASCULAR SURGERY)

## 2024-08-14 PROCEDURE — 99205 OFFICE O/P NEW HI 60 MIN: CPT | Performed by: THORACIC SURGERY (CARDIOTHORACIC VASCULAR SURGERY)

## 2024-08-14 ASSESSMENT — PAIN SCALES - GENERAL: PAINLEVEL: 0-NO PAIN

## 2024-08-14 NOTE — PROGRESS NOTES
HPI:   Chandan Villarreal is a 34 y.o.female referred with a mid esophageal mass.  She presented with chest tightness on July 19, 2024 and went to the emergency room.  A chest CT showed saddle pulmonary emboli and also showed a mass alongside the right aspect of the mid to lower esophagus.  She denies any fevers or chills and denies any dysphagia or weight loss.  She is now on Eliquis twice a day.  Of note she had a prior pulm embolus in 2010 and stopped blood thinners last year.    Past Medical History:   Diagnosis Date    Acute sinusitis 06/13/2023    Chest pain, atypical 06/13/2023    Constipation 06/13/2023    Daily nausea 06/13/2023    Fatigue 06/13/2023    Injury of ankle and foot, left, initial encounter 06/13/2023    Irregular menses 06/13/2023    Pain in unspecified foot 12/17/2019    Foot pain    Personal history of pulmonary embolism 02/04/2021    History of pulmonary embolism    Seasonal allergies 06/13/2023    Vaginal discharge 06/13/2023          Current Outpatient Medications:     apixaban (Eliquis) 5 mg (74 tabs) tablet, Take 1 tablet (5 mg) by mouth 2 times a day., Disp: 60 tablet, Rfl: 3    cholecalciferol (Vitamin D-3) 125 MCG (5000 UT) capsule, Take 1 capsule (125 mcg) by mouth once daily., Disp: , Rfl:     copper (ParaGard T 380A) 380 square mm IUD, 1 each by intrauterine route 1 time., Disp: , Rfl:     cyanocobalamin (Vitamin B-12) 1,000 mcg tablet, Take 1 tablet (1,000 mcg) by mouth once daily., Disp: , Rfl:     ergocalciferol (DrisdoL) 1.25 MG (55993 UT) capsule, Take 1 capsule (1,250 mcg) by mouth 1 (one) time per week., Disp: 4 capsule, Rfl: 5    PSHx:  SHx: never smoker, denies ETOH, or illicit drugs   FMHx: negative for history of thoracic cancer     ROS  General: negative for fever, chills, weight loss, night sweat  Head: negative for severe headache, vision change, blurred vision,   CV: negative for chest pain, dizziness, lightheadedness   Pulm: negative for shortness of breath, dyspnea  on exertion, hemoptysis  GI: negative for diarrhea, constipation, abdominal pain, nausea or vomiting, BRBPR  : negative for dysuria, hematuria, incontinence  Skin: negative for rash  Heme: negative for blood thinner, bleeding disorder or clotting disorder  Endo: negative for heat or cold intolerance, weight gain or weight loss  MSK: negative for rash, edema, weakness    PHYSICAL EXAM  Constitution: well-developed well-nourished in no acute distress  HEENT: NCAT, moist mucosal membrane, neck supple, no crepitus, sclera anicteric  Lymph nodes: no cervical or supraclavicular lymphadenopathy  Cardiac: RRR, normal S1, S2, no mrg  Pulmonary: normal air movement, CTAP, no wcr  Abdomen: soft, non-distended, non-tender, no rigidity, guarding or rebound tenderness, no splenohepatomegaly  Neuro: AOx3, CNII-XII grossly normal  Ext: warm, dry, no edema noted  Skin: dry, clean and intact  Psych: mood and affect wnl    Results    I looked at her CAT scan from July 19 which shows a mass along the right side of the esophagus.    Assessment and Plan:    This is a 34-year-old female with a recently diagnosed saddle pulmonary emboli.  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  I would like to get a PET scan in the near future.  Assuming this shows little to no uptake I would recommend delaying any esophagoscopy or surgery for several months.  We would follow the advice of her hematologist in regards to when it would be safe to stop her blood thinners for any invasive procedures.  I did explain to the patient that surgery would be recommended for an esophageal duplication cyst in order to avoid any infectious complications down the road.        Stew Gandara MD  Chief Division of Thoracic and Esophageal Surgery    [facility]   Co-Director, Thoracic Oncology Program    [facility]  Professor of Surgery     [employer]  Office phone: [phone]  Fax: [phone]

## 2024-08-15 DIAGNOSIS — K22.89 ESOPHAGEAL MASS: Primary | ICD-10-CM

## 2024-08-19 ENCOUNTER — PATIENT OUTREACH (OUTPATIENT)
Dept: PRIMARY CARE | Facility: CLINIC | Age: 34
End: 2024-08-19
Payer: COMMERCIAL

## 2024-08-29 ENCOUNTER — HOSPITAL ENCOUNTER (OUTPATIENT)
Dept: RADIOLOGY | Facility: HOSPITAL | Age: 34
Discharge: HOME | End: 2024-08-29
Payer: COMMERCIAL

## 2024-08-29 ENCOUNTER — APPOINTMENT (OUTPATIENT)
Dept: RADIOLOGY | Facility: HOSPITAL | Age: 34
End: 2024-08-29
Payer: COMMERCIAL

## 2024-08-29 DIAGNOSIS — K22.89 ESOPHAGEAL MASS: ICD-10-CM

## 2024-08-29 PROCEDURE — A9552 F18 FDG: HCPCS | Performed by: THORACIC SURGERY (CARDIOTHORACIC VASCULAR SURGERY)

## 2024-08-29 PROCEDURE — 78815 PET IMAGE W/CT SKULL-THIGH: CPT | Mod: PI

## 2024-08-29 PROCEDURE — 3430000001 HC RX 343 DIAGNOSTIC RADIOPHARMACEUTICALS: Performed by: THORACIC SURGERY (CARDIOTHORACIC VASCULAR SURGERY)

## 2024-08-29 RX ORDER — FLUDEOXYGLUCOSE F 18 200 MCI/ML
13 INJECTION, SOLUTION INTRAVENOUS
Status: COMPLETED | OUTPATIENT
Start: 2024-08-29 | End: 2024-08-29

## 2024-09-03 DIAGNOSIS — J98.59 MEDIASTINAL MASS: ICD-10-CM

## 2024-10-01 ENCOUNTER — APPOINTMENT (OUTPATIENT)
Dept: PRIMARY CARE | Facility: CLINIC | Age: 34
End: 2024-10-01
Payer: COMMERCIAL

## 2024-10-01 VITALS
DIASTOLIC BLOOD PRESSURE: 78 MMHG | SYSTOLIC BLOOD PRESSURE: 118 MMHG | OXYGEN SATURATION: 98 % | BODY MASS INDEX: 33.49 KG/M2 | RESPIRATION RATE: 16 BRPM | HEIGHT: 63 IN | HEART RATE: 79 BPM | WEIGHT: 189 LBS

## 2024-10-01 DIAGNOSIS — E53.8 B12 DEFICIENCY: Primary | ICD-10-CM

## 2024-10-01 DIAGNOSIS — I26.92 ACUTE SADDLE PULMONARY EMBOLISM WITHOUT ACUTE COR PULMONALE (MULTI): ICD-10-CM

## 2024-10-01 DIAGNOSIS — E55.9 VITAMIN D DEFICIENCY: ICD-10-CM

## 2024-10-01 PROBLEM — I26.99 RECURRENT PULMONARY EMBOLI (MULTI): Status: ACTIVE | Noted: 2024-10-01

## 2024-10-01 PROBLEM — Q39.8 CONGENITAL DUPLICATION CYST OF ESOPHAGUS: Status: ACTIVE | Noted: 2024-10-01

## 2024-10-01 PROCEDURE — 3008F BODY MASS INDEX DOCD: CPT | Performed by: FAMILY MEDICINE

## 2024-10-01 PROCEDURE — 99214 OFFICE O/P EST MOD 30 MIN: CPT | Performed by: FAMILY MEDICINE

## 2024-10-01 RX ORDER — LANOLIN ALCOHOL/MO/W.PET/CERES
1000 CREAM (GRAM) TOPICAL DAILY
Qty: 30 TABLET | Refills: 11 | Status: SHIPPED | OUTPATIENT
Start: 2024-10-01

## 2024-10-01 ASSESSMENT — ANXIETY QUESTIONNAIRES
6. BECOMING EASILY ANNOYED OR IRRITABLE: NOT AT ALL
IF YOU CHECKED OFF ANY PROBLEMS ON THIS QUESTIONNAIRE, HOW DIFFICULT HAVE THESE PROBLEMS MADE IT FOR YOU TO DO YOUR WORK, TAKE CARE OF THINGS AT HOME, OR GET ALONG WITH OTHER PEOPLE: NOT DIFFICULT AT ALL
7. FEELING AFRAID AS IF SOMETHING AWFUL MIGHT HAPPEN: NOT AT ALL
GAD7 TOTAL SCORE: 0
3. WORRYING TOO MUCH ABOUT DIFFERENT THINGS: NOT AT ALL
5. BEING SO RESTLESS THAT IT IS HARD TO SIT STILL: NOT AT ALL
1. FEELING NERVOUS, ANXIOUS, OR ON EDGE: NOT AT ALL
4. TROUBLE RELAXING: NOT AT ALL
2. NOT BEING ABLE TO STOP OR CONTROL WORRYING: NOT AT ALL

## 2024-10-01 ASSESSMENT — ENCOUNTER SYMPTOMS
OCCASIONAL FEELINGS OF UNSTEADINESS: 0
LOSS OF SENSATION IN FEET: 0
DEPRESSION: 0

## 2024-10-01 NOTE — PROGRESS NOTES
Subjective   Patient ID: Chandan Villarreal is a 34 y.o. female.    HPI  Chandan is here for follow up. She was seen by Hematology 2022, then Chika, 2023, and me 2024. She had stopped xarelto last year, and then had another pulmonary embolus, and now is Eliquis, has follow up with hematology again in a month or so. They thought it was a blood clot on her legs.   Review of Systems   Cardiovascular:  Positive for chest pain.   All other systems reviewed and are negative.      Objective   Physical Exam  Vitals reviewed.   Constitutional:       Appearance: Normal appearance.   HENT:      Head: Normocephalic and atraumatic.      Right Ear: Tympanic membrane normal.      Left Ear: Tympanic membrane normal.      Nose: Nose normal.      Mouth/Throat:      Mouth: Mucous membranes are moist.      Pharynx: Oropharynx is clear.   Eyes:      Extraocular Movements: Extraocular movements intact.      Conjunctiva/sclera: Conjunctivae normal.      Pupils: Pupils are equal, round, and reactive to light.   Cardiovascular:      Rate and Rhythm: Normal rate and regular rhythm.      Pulses: Normal pulses.      Heart sounds: Normal heart sounds.   Pulmonary:      Effort: Pulmonary effort is normal.      Breath sounds: Normal breath sounds.   Abdominal:      General: Abdomen is flat. Bowel sounds are normal.      Palpations: Abdomen is soft.   Musculoskeletal:         General: Normal range of motion.      Cervical back: Normal range of motion and neck supple.   Skin:     General: Skin is warm and dry.      Capillary Refill: Capillary refill takes less than 2 seconds.   Neurological:      General: No focal deficit present.      Mental Status: She is alert and oriented to person, place, and time.   Psychiatric:         Mood and Affect: Mood normal.         Behavior: Behavior normal.         Assessment/Plan   Diagnoses and all orders for this visit:  B12 deficiency  -     cyanocobalamin (Vitamin B-12) 1,000 mcg tablet; Take 1 tablet (1,000 mcg) by  mouth once daily.  Acute saddle pulmonary embolism without acute cor pulmonale (Multi)  Vitamin D deficiency  Plan on hematology referral, Repeat CT scan of chest and discussion with thoracic surgeon.   Follow up six months to discuss.

## 2024-10-08 ENCOUNTER — APPOINTMENT (OUTPATIENT)
Dept: PRIMARY CARE | Facility: CLINIC | Age: 34
End: 2024-10-08
Payer: COMMERCIAL

## 2024-10-22 ENCOUNTER — PATIENT OUTREACH (OUTPATIENT)
Dept: PRIMARY CARE | Facility: CLINIC | Age: 34
End: 2024-10-22
Payer: COMMERCIAL

## 2024-11-04 ENCOUNTER — OFFICE VISIT (OUTPATIENT)
Dept: HEMATOLOGY/ONCOLOGY | Facility: CLINIC | Age: 34
End: 2024-11-04
Payer: COMMERCIAL

## 2024-11-04 VITALS
HEART RATE: 84 BPM | RESPIRATION RATE: 16 BRPM | OXYGEN SATURATION: 100 % | BODY MASS INDEX: 32.18 KG/M2 | SYSTOLIC BLOOD PRESSURE: 111 MMHG | WEIGHT: 188.49 LBS | TEMPERATURE: 96.8 F | DIASTOLIC BLOOD PRESSURE: 80 MMHG | HEIGHT: 64 IN

## 2024-11-04 DIAGNOSIS — I26.92 ACUTE SADDLE PULMONARY EMBOLISM WITHOUT ACUTE COR PULMONALE (MULTI): ICD-10-CM

## 2024-11-04 LAB
ALBUMIN SERPL BCP-MCNC: 4.3 G/DL (ref 3.4–5)
ALP SERPL-CCNC: 49 U/L (ref 33–110)
ALT SERPL W P-5'-P-CCNC: 15 U/L (ref 7–45)
ANION GAP SERPL CALC-SCNC: 11 MMOL/L (ref 10–20)
AST SERPL W P-5'-P-CCNC: 24 U/L (ref 9–39)
BASOPHILS # BLD AUTO: 0.05 X10*3/UL (ref 0–0.1)
BASOPHILS NFR BLD AUTO: 0.6 %
BILIRUB SERPL-MCNC: 0.4 MG/DL (ref 0–1.2)
BUN SERPL-MCNC: 11 MG/DL (ref 6–23)
CALCIUM SERPL-MCNC: 9.1 MG/DL (ref 8.6–10.3)
CHLORIDE SERPL-SCNC: 108 MMOL/L (ref 98–107)
CO2 SERPL-SCNC: 24 MMOL/L (ref 21–32)
CREAT SERPL-MCNC: 0.7 MG/DL (ref 0.5–1.05)
D DIMER PPP FEU-MCNC: 370 NG/ML FEU
EGFRCR SERPLBLD CKD-EPI 2021: >90 ML/MIN/1.73M*2
EOSINOPHIL # BLD AUTO: 0.13 X10*3/UL (ref 0–0.7)
EOSINOPHIL NFR BLD AUTO: 1.6 %
ERYTHROCYTE [DISTWIDTH] IN BLOOD BY AUTOMATED COUNT: 13 % (ref 11.5–14.5)
GLUCOSE SERPL-MCNC: 95 MG/DL (ref 74–99)
HCT VFR BLD AUTO: 41.5 % (ref 36–46)
HGB BLD-MCNC: 14.3 G/DL (ref 12–16)
IMM GRANULOCYTES # BLD AUTO: 0.02 X10*3/UL (ref 0–0.7)
IMM GRANULOCYTES NFR BLD AUTO: 0.2 % (ref 0–0.9)
LYMPHOCYTES # BLD AUTO: 2.25 X10*3/UL (ref 1.2–4.8)
LYMPHOCYTES NFR BLD AUTO: 27 %
MCH RBC QN AUTO: 29.1 PG (ref 26–34)
MCHC RBC AUTO-ENTMCNC: 34.5 G/DL (ref 32–36)
MCV RBC AUTO: 84 FL (ref 80–100)
MONOCYTES # BLD AUTO: 0.56 X10*3/UL (ref 0.1–1)
MONOCYTES NFR BLD AUTO: 6.7 %
NEUTROPHILS # BLD AUTO: 5.33 X10*3/UL (ref 1.2–7.7)
NEUTROPHILS NFR BLD AUTO: 63.9 %
PLATELET # BLD AUTO: 268 X10*3/UL (ref 150–450)
POTASSIUM SERPL-SCNC: 4 MMOL/L (ref 3.5–5.3)
PROT SERPL-MCNC: 6.7 G/DL (ref 6.4–8.2)
RBC # BLD AUTO: 4.92 X10*6/UL (ref 4–5.2)
SODIUM SERPL-SCNC: 139 MMOL/L (ref 136–145)
WBC # BLD AUTO: 8.3 X10*3/UL (ref 4.4–11.3)

## 2024-11-04 PROCEDURE — 80053 COMPREHEN METABOLIC PANEL: CPT | Performed by: NURSE PRACTITIONER

## 2024-11-04 PROCEDURE — 99204 OFFICE O/P NEW MOD 45 MIN: CPT | Performed by: NURSE PRACTITIONER

## 2024-11-04 PROCEDURE — 3008F BODY MASS INDEX DOCD: CPT | Performed by: NURSE PRACTITIONER

## 2024-11-04 PROCEDURE — 85025 COMPLETE CBC W/AUTO DIFF WBC: CPT | Performed by: NURSE PRACTITIONER

## 2024-11-04 PROCEDURE — 36415 COLL VENOUS BLD VENIPUNCTURE: CPT | Performed by: NURSE PRACTITIONER

## 2024-11-04 PROCEDURE — 85379 FIBRIN DEGRADATION QUANT: CPT | Performed by: NURSE PRACTITIONER

## 2024-11-04 PROCEDURE — 99214 OFFICE O/P EST MOD 30 MIN: CPT | Performed by: NURSE PRACTITIONER

## 2024-11-04 ASSESSMENT — PAIN SCALES - GENERAL: PAINLEVEL_OUTOF10: 0-NO PAIN

## 2024-11-04 NOTE — PATIENT INSTRUCTIONS
Npv with Theresa for bilateral pulmonary embolism  Chandan will continue anticoagulation for life  You will need to call our office at least 1 month before any surgical procedure for instructions on what to do about holding blood thinner    Lab work today   Follow up with Theresa in 1 year

## 2024-11-04 NOTE — PROGRESS NOTES
"Patient ID: Chandan Villarreal is a 34 y.o. female.  Referring Physician: Bridgette Hua MD  [address] [phone]  Primary Care Provider: Tamara Knight MD  Visit Type: Initial Visit      Location: [facility]   Initial consult: 11/4/2024  Reason: PE    34 y.o. with PMHx significant for recurrent PE, DVT in pregnancy, vertigo, GERD present for hematology consult for PE.    Patient went to the ER on 7/19/2024 for right-sided chest pain that was worse with deep breath complaint.     7/19/2024: CT chest angio showed \"Large saddle pulmonary embolus in the right interlobar pulmonary artery extending into branches of the right middle lobe and lower lobe, nearly occlusive.  No evidence of pulmonary infarct or definite right heart strain.\"    Patient was last seen by Dr. Maya for history of PE in 2022. She had her first PE during her second pregnancy in 2011. She states she was given lovenox injections throughout her pregnancy, but was told she did not need to continue them after she delivered. About 1 month after she delivered, she again developed shortness of breath and chest discomfort. Scans reportedly showed bilateral PEs. She was started on warfarin, but had difficulty maintaining therapeutic levels despite compliance and multiple dose changes. In 2012 she was started on Xarelto 20 mg daily. Patient states she stopped taking Xarelto in Feb 2023 per Dr. Maya.     Patient is currently taking Eliquis 5 mg every 12 hours, started in July 2024, has not missed any dose. Currently has copper IUD for pregnancy prevention.     Denies abnormal weight loss, night sweats, fever. Denies fatigue, chills, SOB, chest pain, N/V/D, no PICA. Denies any abnormal bleeding or bruising. No recurrent infections or lymphadenopathy. No joint/body pain. No known blood disorders in family. Has had surgery in past w/o issue. Never had blood/blood products. Denies NSAID use.     PSHx: Apendectomy in 2016  L foot " "ORIF     FHx:  -Mother: Alive, healthy   -Father:  around 50s from liver failure, unknown cancer, unsure if father had PE  -Siblings: 3 siblings, healthy per patient  -Children: 2 girls  -Miscarriages: None    Social Hx:  -Occupation: Unemployed  -Marital Status: Single  -Alcohol Use: No ETOH  -Smoking: None  -Recreational Drug Use: None  -Any special diets: vegetarian, eats eggs, no meat, no fish     Menstrual Cycle History:  -Regular now with copper IUD    Screenings  -Mammograms: About 10 years ago for \"lumps\", normal per patient   -PAP smears: , normal, negative for HPV    Review of Systems:  10 point review of systems negative except as state in HPI    Objective   BSA: 1.96 meters squared  /80 (BP Location: Left arm, Patient Position: Sitting)   Pulse 84   Temp 36 °C (96.8 °F)   Resp 16   Ht (S) 1.619 m (5' 3.74\")   Wt 85.5 kg (188 lb 7.9 oz)   SpO2 100%   BMI 32.62 kg/m²     Family History   Problem Relation Name Age of Onset    Cancer Father's Sister      Osteoporosis Father's Sister       Physical Exam  Constitutional:       Appearance: Normal appearance.   Cardiovascular:      Rate and Rhythm: Normal rate and regular rhythm.   Pulmonary:      Effort: Pulmonary effort is normal.      Breath sounds: Normal breath sounds.   Abdominal:      General: Bowel sounds are normal.      Palpations: Abdomen is soft. There is no mass.      Tenderness: There is no abdominal tenderness.   Musculoskeletal:         General: Normal range of motion.      Cervical back: Normal range of motion.   Skin:     General: Skin is warm and dry.   Neurological:      General: No focal deficit present.      Mental Status: She is alert and oriented to person, place, and time.   Psychiatric:         Mood and Affect: Mood normal.         Behavior: Behavior normal.       WBC   Date/Time Value Ref Range Status   2024 01:40 AM 10.4 4.4 - 11.3 x10*3/uL Final   01/15/2024 03:11 PM 8.6 4.4 - 11.3 x10*3/uL Final "   02/28/2022 10:59 AM 6.2 4.4 - 11.3 x10E9/L Final   02/04/2021 11:22 AM 7.0 4.4 - 11.3 x10E9/L Final   11/24/2020 10:35 AM 5.5 4.4 - 11.3 x10E9/L Final     nRBC   Date Value Ref Range Status   07/19/2024 0.0 0.0 - 0.0 /100 WBCs Final   01/15/2024 0.0 0.0 - 0.0 /100 WBCs Final     RBC   Date Value Ref Range Status   07/19/2024 4.79 4.00 - 5.20 x10*6/uL Final   01/15/2024 4.84 4.00 - 5.20 x10*6/uL Final   02/28/2022 4.78 4.00 - 5.20 x10E12/L Final   02/04/2021 4.62 4.00 - 5.20 x10E12/L Final   11/24/2020 4.71 4.00 - 5.20 x10E12/L Final     Hemoglobin   Date Value Ref Range Status   07/19/2024 14.3 12.0 - 16.0 g/dL Final   01/15/2024 14.2 12.0 - 16.0 g/dL Final   02/28/2022 14.2 12.0 - 16.0 g/dL Final   02/04/2021 13.6 12.0 - 16.0 g/dL Final   11/24/2020 13.8 12.0 - 16.0 g/dL Final     Hematocrit   Date Value Ref Range Status   07/19/2024 40.7 36.0 - 46.0 % Final   01/15/2024 40.9 36.0 - 46.0 % Final   02/28/2022 40.4 36.0 - 46.0 % Final   02/04/2021 39.6 36.0 - 46.0 % Final   11/24/2020 40.1 36.0 - 46.0 % Final     MCV   Date/Time Value Ref Range Status   07/19/2024 01:40 AM 85 80 - 100 fL Final   01/15/2024 03:11 PM 85 80 - 100 fL Final   02/28/2022 10:59 AM 85 80 - 100 fL Final   02/04/2021 11:22 AM 86 80 - 100 fL Final   11/24/2020 10:35 AM 85 80 - 100 fL Final     MCH   Date/Time Value Ref Range Status   07/19/2024 01:40 AM 29.9 26.0 - 34.0 pg Final   01/15/2024 03:11 PM 29.3 26.0 - 34.0 pg Final     MCHC   Date/Time Value Ref Range Status   07/19/2024 01:40 AM 35.1 32.0 - 36.0 g/dL Final   01/15/2024 03:11 PM 34.7 32.0 - 36.0 g/dL Final   02/28/2022 10:59 AM 35.1 32.0 - 36.0 g/dL Final   02/04/2021 11:22 AM 34.3 32.0 - 36.0 g/dL Final   11/24/2020 10:35 AM 34.4 32.0 - 36.0 g/dL Final     RDW   Date/Time Value Ref Range Status   07/19/2024 01:40 AM 12.6 11.5 - 14.5 % Final   01/15/2024 03:11 PM 12.5 11.5 - 14.5 % Final   02/28/2022 10:59 AM 12.5 11.5 - 14.5 % Final   02/04/2021 11:22 AM 13.2 11.5 - 14.5 % Final  "  11/24/2020 10:35 AM 12.7 11.5 - 14.5 % Final     Platelets   Date/Time Value Ref Range Status   07/19/2024 01:40  150 - 450 x10*3/uL Final   01/15/2024 03:11  150 - 450 x10*3/uL Final   02/28/2022 10:59  150 - 450 x10E9/L Final   02/04/2021 11:22  150 - 450 x10E9/L Final   11/24/2020 10:35  150 - 450 x10E9/L Final     No results found for: \"MPV\"  Neutrophils %   Date/Time Value Ref Range Status   07/19/2024 01:40 AM 55.2 40.0 - 80.0 % Final   09/30/2020 11:39 AM 58.1 40.0 - 80.0 % Final     Immature Granulocytes %, Automated   Date/Time Value Ref Range Status   07/19/2024 01:40 AM 0.3 0.0 - 0.9 % Final     Comment:     Immature Granulocyte Count (IG) includes promyelocytes, myelocytes and metamyelocytes but does not include bands. Percent differential counts (%) should be interpreted in the context of the absolute cell counts (cells/UL).   09/30/2020 11:39 AM 0.5 0.0 - 0.9 % Final     Comment:      Immature Granulocyte Count (IG) includes promyelocytes,    myelocytes and metamyelocytes but does not include bands.   Percent differential counts (%) should be interpreted in the   context of the absolute cell counts (cells/L).       Lymphocytes %   Date/Time Value Ref Range Status   07/19/2024 01:40 AM 32.9 13.0 - 44.0 % Final   09/30/2020 11:39 AM 30.5 13.0 - 44.0 % Final     Monocytes %   Date/Time Value Ref Range Status   07/19/2024 01:40 AM 8.2 2.0 - 10.0 % Final   09/30/2020 11:39 AM 8.4 2.0 - 10.0 % Final     Eosinophils %   Date/Time Value Ref Range Status   07/19/2024 01:40 AM 2.6 0.0 - 6.0 % Final   09/30/2020 11:39 AM 1.8 0.0 - 6.0 % Final     Basophils %   Date/Time Value Ref Range Status   07/19/2024 01:40 AM 0.8 0.0 - 2.0 % Final   09/30/2020 11:39 AM 0.7 0.0 - 2.0 % Final     Neutrophils Absolute   Date/Time Value Ref Range Status   07/19/2024 01:40 AM 5.74 1.20 - 7.70 x10*3/uL Final     Comment:     Percent differential counts (%) should be interpreted in the context of the " "absolute cell counts (cells/uL).   09/30/2020 11:39 AM 4.99 1.20 - 7.70 x10E9/L Final     Immature Granulocytes Absolute, Automated   Date/Time Value Ref Range Status   07/19/2024 01:40 AM 0.03 0.00 - 0.70 x10*3/uL Final     Lymphocytes Absolute   Date/Time Value Ref Range Status   07/19/2024 01:40 AM 3.42 1.20 - 4.80 x10*3/uL Final   09/30/2020 11:39 AM 2.61 1.20 - 4.80 x10E9/L Final     Monocytes Absolute   Date/Time Value Ref Range Status   07/19/2024 01:40 AM 0.85 0.10 - 1.00 x10*3/uL Final   09/30/2020 11:39 AM 0.72 0.10 - 1.00 x10E9/L Final     Eosinophils Absolute   Date/Time Value Ref Range Status   07/19/2024 01:40 AM 0.27 0.00 - 0.70 x10*3/uL Final   09/30/2020 11:39 AM 0.15 0.00 - 0.70 x10E9/L Final     Basophils Absolute   Date/Time Value Ref Range Status   07/19/2024 01:40 AM 0.08 0.00 - 0.10 x10*3/uL Final   09/30/2020 11:39 AM 0.06 0.00 - 0.10 x10E9/L Final     Assessment/Plan    34 y.o. female with a PMH significant for  recurrent PE, DVT in pregnancy, vertigo, GERD present for hematology consult for PE.    # Recurrent PE  Patient was last seen by Dr. Maya for history of PE in 2022. She had her first PE during her second pregnancy in 2011. She states she was given lovenox injections throughout her pregnancy, stopped taking them after pregnancy. 1 month later, she had another PE, was started on Coumadin which was switched to Xarelto due to ineffective INRs. Xarelto stopped in Feb 2023 by Dr Maya. Most recent PE in July 2024, which was \"unprovoked\". She is now on Eliquis every 12 hours and is complaint. Currently has copper IUD for pregnancy prevention. D dimer was elevated in ER, will repeat it today. Factor V Leiden, Prothrombin gene mutation and other coag testing were negative in 2022, patient is unsure whether testing were completed while she was on xarelto. Nevertheless, she will need to be on lifelong anticoagulant since this is her 3rd PE.    -Avoid NSAIDs and ASA while on blood " thinners as they affect platelet activity and increase risk of bleeding  -Use Tylenol preferably for pain  -Avoid contact sports  -Avoid high speed rides at amusement [place] like [place]  -Call or visit closest ER with any persistent headaches, visual disturbances, weakness, numbness, severe bleeding/bruising or any concerning signs or symptoms.  -May need to wear a medical alert bracelet stating the use of anticoagulants.  -To call and schedule appointment with one of our physicians for pre-operative clearance and recommendations at least 1 month before any invasive procedures/surgeries    Plan:  -Recheck D dimer, CBC/d, CMP today   - Continue with Eliquis 5 mg every 12 hours  - follow up next: 1 year  - labs prior to follow up: CBC/d, CMP       Diagnoses and all orders for this visit:  Acute saddle pulmonary embolism without acute cor pulmonale (Multi)  -     Referral to Hematology and Oncology  -     D-dimer, Non VTE; Future  -     CBC and Auto Differential; Future  -     Comprehensive Metabolic Panel; Future  -     Clinic Appointment Request Follow up; CASAL, ROSANNE M; Future        Patient was seen and evaluated under the supervision of Rosanne M Casal, APRN-CHERIE, SELMA--CRISTEL Carreno-CNP Rosanne M Casal, APRN-CHERIE, SELMA

## 2024-12-09 ENCOUNTER — TELEPHONE (OUTPATIENT)
Dept: OBSTETRICS AND GYNECOLOGY | Facility: CLINIC | Age: 34
End: 2024-12-09
Payer: COMMERCIAL

## 2024-12-09 ENCOUNTER — TELEPHONE (OUTPATIENT)
Dept: PRIMARY CARE | Facility: CLINIC | Age: 34
End: 2024-12-09
Payer: COMMERCIAL

## 2024-12-09 NOTE — TELEPHONE ENCOUNTER
Patient is asking had period last week Monday and stopped Friday and then in Saturday she started bleeding again not heavy but dark red. It is not all day but a few hours on Saturday and Sunday. Is asking if it could be the blood thinners she is taking? Is going tyo reach out to OB as well

## 2024-12-10 ENCOUNTER — APPOINTMENT (OUTPATIENT)
Dept: RADIOLOGY | Facility: HOSPITAL | Age: 34
End: 2024-12-10
Payer: COMMERCIAL

## 2024-12-10 ENCOUNTER — HOSPITAL ENCOUNTER (EMERGENCY)
Facility: HOSPITAL | Age: 34
Discharge: HOME | End: 2024-12-10
Payer: COMMERCIAL

## 2024-12-10 VITALS
HEART RATE: 77 BPM | TEMPERATURE: 98.2 F | DIASTOLIC BLOOD PRESSURE: 94 MMHG | WEIGHT: 186 LBS | SYSTOLIC BLOOD PRESSURE: 128 MMHG | HEIGHT: 63 IN | BODY MASS INDEX: 32.96 KG/M2 | RESPIRATION RATE: 16 BRPM | OXYGEN SATURATION: 100 %

## 2024-12-10 DIAGNOSIS — N93.9 ABNORMAL UTERINE BLEEDING: Primary | ICD-10-CM

## 2024-12-10 LAB
ALBUMIN SERPL BCP-MCNC: 4.7 G/DL (ref 3.4–5)
ALP SERPL-CCNC: 62 U/L (ref 33–110)
ALT SERPL W P-5'-P-CCNC: 9 U/L (ref 7–45)
ANION GAP SERPL CALC-SCNC: 10 MMOL/L (ref 10–20)
APTT PPP: 36 SECONDS (ref 27–38)
AST SERPL W P-5'-P-CCNC: 13 U/L (ref 9–39)
B-HCG SERPL-ACNC: <2 MIU/ML
BASOPHILS # BLD AUTO: 0.08 X10*3/UL (ref 0–0.1)
BASOPHILS NFR BLD AUTO: 0.9 %
BILIRUB SERPL-MCNC: 0.5 MG/DL (ref 0–1.2)
BUN SERPL-MCNC: 9 MG/DL (ref 6–23)
CALCIUM SERPL-MCNC: 8.9 MG/DL (ref 8.6–10.3)
CHLORIDE SERPL-SCNC: 106 MMOL/L (ref 98–107)
CO2 SERPL-SCNC: 24 MMOL/L (ref 21–32)
CREAT SERPL-MCNC: 0.7 MG/DL (ref 0.5–1.05)
EGFRCR SERPLBLD CKD-EPI 2021: >90 ML/MIN/1.73M*2
EOSINOPHIL # BLD AUTO: 0.14 X10*3/UL (ref 0–0.7)
EOSINOPHIL NFR BLD AUTO: 1.6 %
ERYTHROCYTE [DISTWIDTH] IN BLOOD BY AUTOMATED COUNT: 12.3 % (ref 11.5–14.5)
GLUCOSE SERPL-MCNC: 99 MG/DL (ref 74–99)
HCT VFR BLD AUTO: 42.1 % (ref 36–46)
HGB BLD-MCNC: 15.1 G/DL (ref 12–16)
IMM GRANULOCYTES # BLD AUTO: 0.02 X10*3/UL (ref 0–0.7)
IMM GRANULOCYTES NFR BLD AUTO: 0.2 % (ref 0–0.9)
INR PPP: 1.3 (ref 0.9–1.1)
LYMPHOCYTES # BLD AUTO: 2.52 X10*3/UL (ref 1.2–4.8)
LYMPHOCYTES NFR BLD AUTO: 29.3 %
MCH RBC QN AUTO: 29.6 PG (ref 26–34)
MCHC RBC AUTO-ENTMCNC: 35.9 G/DL (ref 32–36)
MCV RBC AUTO: 83 FL (ref 80–100)
MONOCYTES # BLD AUTO: 0.68 X10*3/UL (ref 0.1–1)
MONOCYTES NFR BLD AUTO: 7.9 %
NEUTROPHILS # BLD AUTO: 5.15 X10*3/UL (ref 1.2–7.7)
NEUTROPHILS NFR BLD AUTO: 60.1 %
NRBC BLD-RTO: 0 /100 WBCS (ref 0–0)
PLATELET # BLD AUTO: 299 X10*3/UL (ref 150–450)
POTASSIUM SERPL-SCNC: 4.3 MMOL/L (ref 3.5–5.3)
PROT SERPL-MCNC: 7.3 G/DL (ref 6.4–8.2)
PROTHROMBIN TIME: 14.3 SECONDS (ref 9.8–12.8)
RBC # BLD AUTO: 5.1 X10*6/UL (ref 4–5.2)
SODIUM SERPL-SCNC: 136 MMOL/L (ref 136–145)
WBC # BLD AUTO: 8.6 X10*3/UL (ref 4.4–11.3)

## 2024-12-10 PROCEDURE — 76830 TRANSVAGINAL US NON-OB: CPT | Mod: FOREIGN READ | Performed by: RADIOLOGY

## 2024-12-10 PROCEDURE — 76830 TRANSVAGINAL US NON-OB: CPT

## 2024-12-10 PROCEDURE — 36415 COLL VENOUS BLD VENIPUNCTURE: CPT | Performed by: NURSE PRACTITIONER

## 2024-12-10 PROCEDURE — 99285 EMERGENCY DEPT VISIT HI MDM: CPT | Mod: 25

## 2024-12-10 PROCEDURE — 85730 THROMBOPLASTIN TIME PARTIAL: CPT | Performed by: NURSE PRACTITIONER

## 2024-12-10 PROCEDURE — 85025 COMPLETE CBC W/AUTO DIFF WBC: CPT | Performed by: NURSE PRACTITIONER

## 2024-12-10 PROCEDURE — 84702 CHORIONIC GONADOTROPIN TEST: CPT | Performed by: NURSE PRACTITIONER

## 2024-12-10 PROCEDURE — 80053 COMPREHEN METABOLIC PANEL: CPT | Performed by: NURSE PRACTITIONER

## 2024-12-10 ASSESSMENT — PAIN DESCRIPTION - PAIN TYPE: TYPE: ACUTE PAIN

## 2024-12-10 ASSESSMENT — PAIN SCALES - GENERAL: PAINLEVEL_OUTOF10: 3

## 2024-12-10 ASSESSMENT — LIFESTYLE VARIABLES
EVER HAD A DRINK FIRST THING IN THE MORNING TO STEADY YOUR NERVES TO GET RID OF A HANGOVER: NO
TOTAL SCORE: 0
HAVE YOU EVER FELT YOU SHOULD CUT DOWN ON YOUR DRINKING: NO
HAVE PEOPLE ANNOYED YOU BY CRITICIZING YOUR DRINKING: NO
EVER FELT BAD OR GUILTY ABOUT YOUR DRINKING: NO

## 2024-12-10 ASSESSMENT — PAIN DESCRIPTION - ORIENTATION: ORIENTATION: LEFT;LOWER

## 2024-12-10 ASSESSMENT — COLUMBIA-SUICIDE SEVERITY RATING SCALE - C-SSRS
2. HAVE YOU ACTUALLY HAD ANY THOUGHTS OF KILLING YOURSELF?: NO
6. HAVE YOU EVER DONE ANYTHING, STARTED TO DO ANYTHING, OR PREPARED TO DO ANYTHING TO END YOUR LIFE?: NO
1. IN THE PAST MONTH, HAVE YOU WISHED YOU WERE DEAD OR WISHED YOU COULD GO TO SLEEP AND NOT WAKE UP?: NO

## 2024-12-10 ASSESSMENT — PAIN DESCRIPTION - LOCATION: LOCATION: ABDOMEN

## 2024-12-10 ASSESSMENT — PAIN - FUNCTIONAL ASSESSMENT: PAIN_FUNCTIONAL_ASSESSMENT: 0-10

## 2024-12-10 NOTE — TELEPHONE ENCOUNTER
Pt reports AUB. She had a normal period last week but after she had stopped bleeding for several days- is now experiencing 4 additional days of bleeding. Pt reports she is worried because she will bleed a moderate amount for 4 to 5 hours, and then stop bleeding until the next day. Changes her pad or tampon about 2 times throughout that 4 to 5 hours but it hasn't ever been fully saturated. Having some normal cramping like she would expect with a period. When asked if she is having any constant pain, pt reports constant left pelvic pain for the past hour. When asked to rate this pain, pt reports it is 5-6/10 pain. I clarified by asking pt to confirm if it is moderate pain that is getting in the way of activity, or if it is mild and she can ignore it. Pt then clarified that it is mild and she can ignore it. Pt took upt which was negative. Pt denies concern for STDs and urinary symptoms. Pt scheduled for next open visit- 1/3/24 but is requesting to be seen sooner because she feels like it is not good for her to bleed like this. I reviewed with her that I can change with Eli to see if she can get her in any sooner than 1/3/24, but that at this point, she seems to be stable. Reviewed that she should call or go to ER if she develops severe pain or heavy bleeding (pad/hour). Pt agreeable and will wait to hear back about appointment until later in the week when Eli is back in.

## 2024-12-10 NOTE — TELEPHONE ENCOUNTER
Pt took a pregnancy test which was negative.  She would like a sooner appointment due to the bleeding.  Pt feels that waiting for the 1/3/25 appt is too far away, she does not think this bleeding is good for her body.

## 2024-12-10 NOTE — Clinical Note
Chandan Villarreal was seen and treated in our emergency department on 12/10/2024.  She may return to work on 12/11/2024.       If you have any questions or concerns, please don't hesitate to call.      Teri Luna, APRN-CNP

## 2024-12-11 ENCOUNTER — TELEPHONE (OUTPATIENT)
Dept: OBSTETRICS AND GYNECOLOGY | Facility: CLINIC | Age: 34
End: 2024-12-11
Payer: COMMERCIAL

## 2024-12-11 NOTE — TELEPHONE ENCOUNTER
Pt called stating she went to ER and they spoke to Dr. Hernandez because she was on call.  States she advised being seen this week. But Eli Ya has her scheduled 1/3.  Did CARLOS mean to put her on her schedule or LQ to squeeze her in somehow.  Here is the ER note.      On initial evaluation patient is well-appearing the emergency department at this time. She does have slight left adnexal tenderness in the region. Beta quant less than 2 CMP is within normal limits coags show INR 1.3 CBC shows no leukocytosis or signs of anemia with a hemoglobin of 15.1. Ultrasound showed uterine myometrium is heterogenous in echotexture with shadows of linear striated shadowing could be seen with adenomyosis pelvic ultrasound should be considered for further evaluation. IUD is in place. I spoke with OB/GYN on-call Dr. Hernandez who states to have patient call the office tomorrow so that she can get in with appointment this week. I discussed this with the patient and She verbalized understanding agreement plan her vital signs have remained stable educated on strict return precautions outpatient follow-up and patient will be discharged home in stable condition.    other/bed bound

## 2024-12-11 NOTE — ED PROVIDER NOTES
HPI   Chief Complaint   Patient presents with    Vaginal Bleeding     PT period started Monday, ended Friday, then Saturday bleeding began and then stopped, Sunday bleeding and stopped, same with  yesterday and today.  Her OB cannot get her in until June 3rd.       34-year-old female with past history of PE on anticoagulation presents emergency department today for vaginal bleeding.  Patient states she started her menstrual cycle last Monday and it stopped on Friday.  She then had repeat bleeding for the past few days and became concerned.  States this is never happened in the past.  She does have a copper IUD.  Has been taking her anticoagulation as prescribed.  States she is not going through more than a pad an hour.  States is more so 1 pad every 4 hours or so.  Is not large clots.  She is having some slight left adnexal discomfort.  No chest pain, shortness of breath, dizziness headache or syncope.  Denies any nausea or vomiting.  No urinary symptoms.  States she is feeling well overall otherwise.                          No data recorded                  Patient History   Past Medical History:   Diagnosis Date    Acute sinusitis 06/13/2023    Chest pain, atypical 06/13/2023    Constipation 06/13/2023    Daily nausea 06/13/2023    Fatigue 06/13/2023    Injury of ankle and foot, left, initial encounter 06/13/2023    Irregular menses 06/13/2023    Pain in unspecified foot 12/17/2019    Foot pain    Personal history of pulmonary embolism 02/04/2021    History of pulmonary embolism    Seasonal allergies 06/13/2023    Vaginal discharge 06/13/2023     Past Surgical History:   Procedure Laterality Date    OTHER SURGICAL HISTORY  09/16/2019    Appendectomy    OTHER SURGICAL HISTORY  12/15/2020    Foot surgery     Family History   Problem Relation Name Age of Onset    Cancer Father's Sister      Osteoporosis Father's Sister       Social History     Tobacco Use    Smoking status: Never    Smokeless tobacco: Never   Vaping  Use    Vaping status: Never Used   Substance Use Topics    Alcohol use: Never    Drug use: Never       Physical Exam   ED Triage Vitals   Temperature Heart Rate Respirations BP   12/10/24 1856 12/10/24 1856 12/10/24 1856 12/10/24 1858   36.8 °C (98.2 °F) 90 18 (!) 137/99      Pulse Ox Temp src Heart Rate Source Patient Position   12/10/24 1856 -- -- --   99 %         BP Location FiO2 (%)     -- --             Physical Exam  Vitals and nursing note reviewed.   Constitutional:       General: She is not in acute distress.     Appearance: Normal appearance. She is not toxic-appearing.   HENT:      Right Ear: Tympanic membrane normal.      Left Ear: Tympanic membrane normal.      Mouth/Throat:      Mouth: Mucous membranes are moist.      Pharynx: Oropharynx is clear.   Eyes:      Extraocular Movements: Extraocular movements intact.      Pupils: Pupils are equal, round, and reactive to light.   Cardiovascular:      Rate and Rhythm: Normal rate and regular rhythm.      Pulses: Normal pulses.      Heart sounds: Normal heart sounds.   Pulmonary:      Effort: Pulmonary effort is normal.      Breath sounds: Normal breath sounds.   Abdominal:      General: Abdomen is flat. Bowel sounds are normal.      Palpations: Abdomen is soft.      Tenderness: There is no abdominal tenderness.   Genitourinary:     Comments: However does have some slight discomfort over the left adnexal region patient seen out in provider in triage room.  She deferred  exam.  Musculoskeletal:         General: Normal range of motion.      Cervical back: Normal range of motion and neck supple.   Skin:     General: Skin is warm and dry.      Capillary Refill: Capillary refill takes less than 2 seconds.   Neurological:      General: No focal deficit present.      Mental Status: She is alert and oriented to person, place, and time.   Psychiatric:         Mood and Affect: Mood normal.         Behavior: Behavior normal.         Judgment: Judgment normal.          Labs Reviewed   COAGULATION SCREEN - Abnormal       Result Value    Protime 14.3 (*)     INR 1.3 (*)     aPTT 36      Narrative:     The APTT is no longer used for monitoring Unfractionated Heparin Therapy. For monitoring Heparin Therapy, use the Heparin Assay.   HUMAN CHORIONIC GONADOTROPIN, SERUM QUANTITATIVE - Normal    HCG, Beta-Quantitative <2      Narrative:      Total HCG measurement is performed using the [employer] Access   Immunoassay which detects intact HCG and free beta HCG subunit.    This test is not indicated for use as a tumor marker.   HCG testing is performed using a different test methodology at [facility] than other [facility]. Direct result comparison   should only be made within the same method.       COMPREHENSIVE METABOLIC PANEL - Normal    Glucose 99      Sodium 136      Potassium 4.3      Chloride 106      Bicarbonate 24      Anion Gap 10      Urea Nitrogen 9      Creatinine 0.70      eGFR >90      Calcium 8.9      Albumin 4.7      Alkaline Phosphatase 62      Total Protein 7.3      AST 13      Bilirubin, Total 0.5      ALT 9     CBC WITH AUTO DIFFERENTIAL    WBC 8.6      nRBC 0.0      RBC 5.10      Hemoglobin 15.1      Hematocrit 42.1      MCV 83      MCH 29.6      MCHC 35.9      RDW 12.3      Platelets 299      Neutrophils % 60.1      Immature Granulocytes %, Automated 0.2      Lymphocytes % 29.3      Monocytes % 7.9      Eosinophils % 1.6      Basophils % 0.9      Neutrophils Absolute 5.15      Immature Granulocytes Absolute, Automated 0.02      Lymphocytes Absolute 2.52      Monocytes Absolute 0.68      Eosinophils Absolute 0.14      Basophils Absolute 0.08       Pain Management Panel           No data to display              US pelvis transvaginal   Final Result   The uterine myometrium is heterogeneous in echotexture with areas of   linear striated shadowing.  These findings can be seen with   adenomyosis and pelvic MRI should be considered for  further   evaluation.   Normal color and spectral Doppler flow within both ovaries.   IUD within appropriate position.   Signed by Miguel Echavarria MD          ED Course & MDM   Diagnoses as of 12/13/24 0711   Abnormal uterine bleeding       Medical Decision Making  On initial evaluation patient is well-appearing the emergency department at this time.  She does have slight left adnexal tenderness in the region.  Beta quant less than 2 CMP is within normal limits coags show INR 1.3 CBC shows no leukocytosis or signs of anemia with a hemoglobin of 15.1.  Ultrasound showed uterine myometrium is heterogenous in echotexture with shadows of linear striated shadowing could be seen with adenomyosis pelvic ultrasound should be considered for further evaluation.  IUD is in place.  I spoke with OB/GYN on-call Dr. Hernandez who states to have patient call the office tomorrow so that she can get in with appointment this week.  I discussed this with the patient and She verbalized understanding agreement plan her vital signs have remained stable educated on strict return precautions outpatient follow-up and patient will be discharged home in stable condition.        Procedure  Procedures  I discussed the differential, results and discharge plan with the patient and/or family/friend/caregiver if present.  I emphasized the importance of follow-up with the physician I referred them to in the timeframe recommended.  I explained reasons for the patient to return to the Emergency Department. Additional verbal discharge instructions were also given and discussed with the patient to supplement those generated by the EMR. We also discussed medications that were prescribed (if any) including common side effects and interactions. The patient was advised to abstain from driving, operating heavy machinery or making significant decisions while taking medications such as opiates and muscle relaxers that may impair this. All questions were addressed.   They understand return precautions and discharge instructions. The patient and/or family/friend/caregiver expressed understanding.           Teri Luna, APRN-CHERIE  12/10/24 2129       Teri Luna, CRISTEL-CHERIE  12/13/24 0711

## 2024-12-12 ENCOUNTER — TELEPHONE (OUTPATIENT)
Dept: PRIMARY CARE | Facility: CLINIC | Age: 34
End: 2024-12-12
Payer: COMMERCIAL

## 2024-12-13 ASSESSMENT — ENCOUNTER SYMPTOMS
NAUSEA: 0
BACK PAIN: 0
HEMATURIA: 0
FEVER: 0
FLANK PAIN: 0
CONSTIPATION: 1
SORE THROAT: 0
FREQUENCY: 0
ANOREXIA: 0
DYSURIA: 0
ABDOMINAL PAIN: 0
DIARRHEA: 0
VOMITING: 0
CHILLS: 0
HEADACHES: 0

## 2024-12-17 ENCOUNTER — TELEPHONE (OUTPATIENT)
Dept: PRIMARY CARE | Facility: CLINIC | Age: 34
End: 2024-12-17
Payer: COMMERCIAL

## 2024-12-17 DIAGNOSIS — I26.99 RECURRENT PULMONARY EMBOLI (MULTI): Primary | ICD-10-CM

## 2024-12-17 DIAGNOSIS — I26.92 ACUTE SADDLE PULMONARY EMBOLISM WITHOUT ACUTE COR PULMONALE (MULTI): ICD-10-CM

## 2024-12-17 PROBLEM — Z01.419 WELL WOMAN EXAM WITH ROUTINE GYNECOLOGICAL EXAM: Status: ACTIVE | Noted: 2024-12-17

## 2024-12-17 PROBLEM — N92.1 MENORRHAGIA WITH IRREGULAR CYCLE: Status: ACTIVE | Noted: 2024-12-17

## 2024-12-17 ASSESSMENT — ENCOUNTER SYMPTOMS
NAUSEA: 0
HEMATURIA: 0
DYSURIA: 0
DIARRHEA: 0
HEADACHES: 0
ABDOMINAL PAIN: 0
ANOREXIA: 0
FEVER: 0
FLANK PAIN: 0
SORE THROAT: 0
BACK PAIN: 0
VOMITING: 0
CHILLS: 0
CONSTIPATION: 1
FREQUENCY: 0

## 2024-12-17 NOTE — PROGRESS NOTES
Subjective   Patient ID: Chandan Villarreal is a 34 y.o. female who presents for ER Follow-up.  She presents today for gyn visit. Last annual exam was on 6/29/2023 with Samanta Ya CNM.  She was seen in the ER on 12/10/2024 for moderate vaginal bleeding. She had expected menses followed by return of moderate vaginal bleeding a few days later. She states menses are typically monthly and last 3-4 days. This cycle she stopped menses then had return of bleeding off and on for a few days, prompting her presentation to the ER. This was the first time she experienced irregular bleeding. Her history is significant for past pulmonary embolism and she is on anticoagulation. She had been off anticoagulation for 1.5years but she restarted this in the summer due to getting another pulmonary embolism.  She also has a copper IUD for many years, with this last exchanged on 5/24/2021.    Her evaluation included examination with mild to moderate vaginal bleeding noted.   Labs returned without anemia on CBC, and normal results for CMP, CBC and negative HCG. TSH was in normal range on 7/19/2024.    Pelvic ultrasound 12/10/2024 was significant for possible adenomyosis, and the presence of IUD:  UTERUS:     The uterus is anteverted in position and measures 9.6 x 4.6 x 5.4 cm.  The uterine myometrium is heterogeneous in echotexture with multiple areas of linear striated shadowing.    ENDOMETRIUM:   The endometrium measures 0.7 cm. IUD is identified and appropriately positioned within the endometrium.     RIGHT OVARY:  The right ovary measures 3.0 x 2.0 x 2.6 cm.  The right ovary demonstrates a normal echotexture.  Spectral Doppler evaluation of the ovary was performed.  Normal color and spectral Doppler flow is visualized.    LEFT OVARY:  The left ovary measures 2.7 x 2.2 x 2.6 cm.  The left ovary demonstrates a normal echotexture.  Spectral Doppler evaluation of the ovary was performed.  Normal color and spectral Doppler flow is  visualized.        We reviewed options of observation, removal of ParaGard IUD, replacement with progestin releasing IUD, systemic hormonal medication and surgical management including endometrial ablation and hysterectomy. She would need to be off anticoagulation prior to surgery and she hopes to avoid any surgery. Systemic hormonal medication is not recommended given increased risk of VTE. Since this is the first time she has had irregular menses in years, she desires to observe for now. We discussed obtaining TSH now to assess for thyroid disorder contributing to new irregular bleeding.    We reviewed ultrasound with normal appearing endometrium, normal position of IUD, no evidence of fibroids. We did discuss potential for adenomyosis, which is not a concern unless causing heavy or painful menses.     Female  Problem  The patient's pertinent negatives include no genital itching, genital lesions, genital odor, genital rash, missed menses, pelvic pain, vaginal bleeding or vaginal discharge. This is a new problem. The current episode started 1 to 4 weeks ago. The problem occurs constantly. The problem has been gradually improving. The patient is experiencing no pain. She is not pregnant. Associated symptoms include constipation. Pertinent negatives include no abdominal pain, anorexia, back pain, chills, diarrhea, discolored urine, dysuria, fever, flank pain, frequency, headaches, hematuria, joint pain, joint swelling, nausea, painful intercourse, rash, sore throat, urgency or vomiting. Nothing aggravates the symptoms. She is sexually active. No, her partner does not have an STD. She uses an IUD for contraception. Her menstrual history has been irregular.         Review of Systems   Constitutional:  Negative for activity change, chills and fever.   HENT:  Negative for congestion and sore throat.    Respiratory:  Negative for apnea and cough.    Cardiovascular:  Negative for chest pain.   Gastrointestinal:  Positive  for constipation. Negative for abdominal pain, anorexia, diarrhea, nausea and vomiting.   Genitourinary:  Negative for dysuria, flank pain, frequency, hematuria, missed menses, pelvic pain, urgency, vaginal discharge and vaginal pain.   Musculoskeletal:  Negative for back pain, joint pain and joint swelling.   Skin:  Negative for rash.   Neurological:  Negative for dizziness and headaches.   Psychiatric/Behavioral:  Negative for agitation.        Past Medical History:   Diagnosis Date    Acute sinusitis 06/13/2023    Chest pain, atypical 06/13/2023    Constipation 06/13/2023    Daily nausea 06/13/2023    Fatigue 06/13/2023    Injury of ankle and foot, left, initial encounter 06/13/2023    Irregular menses 06/13/2023    Pain in unspecified foot 12/17/2019    Foot pain    Personal history of pulmonary embolism 02/04/2021    History of pulmonary embolism    Pulmonary embolism September 2010/2024    Seasonal allergies 06/13/2023    Vaginal discharge 06/13/2023      Past Surgical History:   Procedure Laterality Date    OTHER SURGICAL HISTORY  09/16/2019    Appendectomy    OTHER SURGICAL HISTORY  12/15/2020    Foot surgery      Allergies   Allergen Reactions    Adhesive Tape-Silicones Rash      Current Outpatient Medications on File Prior to Visit   Medication Sig Dispense Refill    copper (ParaGard T 380A) 380 square mm IUD 1 each by intrauterine route 1 time.      cyanocobalamin (Vitamin B-12) 1,000 mcg tablet Take 1 tablet (1,000 mcg) by mouth once daily. 30 tablet 11    [START ON 12/22/2024] ergocalciferol (Vitamin D-2) 1.25 MG (74828 UT) capsule TAKE 1 CAPSULE (1,250 MCG) BY MOUTH 1 (ONE) TIME PER WEEK. 4 capsule 0    rivaroxaban (Xarelto) 20 mg tablet Take 1 tablet (20 mg) by mouth once daily. Take with food. 30 tablet 11    apixaban (Eliquis) 5 mg tablet Take 1 tablet (5 mg) by mouth 2 times a day. (Patient not taking: Reported on 12/18/2024) 60 tablet 0    [DISCONTINUED] apixaban (Eliquis) 5 mg (74 tabs) tablet  Take 1 tablet (5 mg) by mouth 2 times a day. 60 tablet 3    [DISCONTINUED] apixaban (Eliquis) 5 mg (74 tabs) tablet Take 1 tablet (5 mg) by mouth 2 times a day. 60 tablet 0    [DISCONTINUED] apixaban (Eliquis) 5 mg tablet Take 1 tablet (5 mg) by mouth 2 times a day. 60 tablet 0    [DISCONTINUED] ergocalciferol (DrisdoL) 1.25 MG (88689 UT) capsule Take 1 capsule (1,250 mcg) by mouth 1 (one) time per week. 4 capsule 5     No current facility-administered medications on file prior to visit.        Objective   Physical Exam  Constitutional:       Appearance: Normal appearance.   Neck:      Thyroid: No thyromegaly.   Cardiovascular:      Rate and Rhythm: Normal rate and regular rhythm.      Heart sounds: Normal heart sounds.   Pulmonary:      Effort: Pulmonary effort is normal.      Breath sounds: Normal breath sounds.   Chest:      Chest wall: No mass.   Breasts:     Right: Normal. No inverted nipple, mass, nipple discharge or skin change.      Left: Normal. No inverted nipple, mass, nipple discharge or skin change.   Abdominal:      General: There is no distension.      Palpations: Abdomen is soft. There is no mass.      Tenderness: There is no abdominal tenderness.   Genitourinary:     General: Normal vulva.      Exam position: Lithotomy position.      Labia:         Right: No rash.         Left: No rash.       Urethra: No urethral lesion.      Vagina: Normal. No lesions.      Cervix: No friability or lesion.      Uterus: Normal. Not enlarged and not tender.       Adnexa: Right adnexa normal and left adnexa normal.        Right: No mass or tenderness.          Left: No mass or tenderness.        Comments: IUD strings are noted.  Musculoskeletal:         General: No deformity.      Cervical back: Neck supple.   Lymphadenopathy:      Cervical: No cervical adenopathy.   Skin:     General: Skin is warm and dry.      Findings: No rash.   Neurological:      General: No focal deficit present.      Mental Status: She is  alert.   Psychiatric:         Mood and Affect: Mood normal.         Behavior: Behavior is cooperative.         Thought Content: Thought content normal.           Problem List Items Addressed This Visit       Well woman exam with routine gynecological exam    Overview     9/30/2020 pap returned negative.   She uses ParaGard IUD for contraception. Systemic hormone use is not recommended due to past pulmonary embolism.         Current Assessment & Plan     Pap is sent with STD screen and HPV.  Regular exercise and attaining/maintaining a healthy weight is encouraged.   Adequate calcium intake with diet or supplements is encouraged.    We will notify of any abnormal results.          Menorrhagia with irregular cycle - Primary    Overview     She presented to ER 12/10/2024 with heavy menstrual bleeding. She has a ParaGard IUD for contraception and is on anticoagulation for history of Pulmonary Embolism first occurring during pregnancy.  12/10/2024 ultrasound shows heterogeneous myometrium with possibility of adenomyosis. IUD is in position and endometrium measures 0.7 cm.          Current Assessment & Plan     We discussed treatment and evaluation options for menorrhagia in the setting of ParaGard IUD and anticoagulation. Copper IUD removal may aid in decreasing menstrual bleeding. Replacement with progestin releasing IUD would likely improve menstrual bleeding significantly, with small risk of VTE due to small systemic absorption of progestin. Surgical management options were also reviewed, including endometrial ablation and hysterectomy. Alternative hormonal treatments were reviewed, with associated increased risk of VTE.    She desires to observe bleeding for now. TSH is ordered due to irregular timing of recent bleeding.          Relevant Orders    TSH with reflex to Free T4 if abnormal    IUD (intrauterine device) in place    Overview     ParaGard copper IUD was inserted on 5/24/2021.

## 2024-12-17 NOTE — ASSESSMENT & PLAN NOTE
We discussed treatment and evaluation options for menorrhagia in the setting of ParaGard IUD and anticoagulation. Copper IUD removal may aid in decreasing menstrual bleeding. Replacement with progestin releasing IUD would likely improve menstrual bleeding significantly, with small risk of VTE due to small systemic absorption of progestin. Surgical management options were also reviewed, including endometrial ablation and hysterectomy. Alternative hormonal treatments were reviewed, with associated increased risk of VTE.    She desires to observe bleeding for now. TSH is ordered due to irregular timing of recent bleeding.

## 2024-12-17 NOTE — TELEPHONE ENCOUNTER
Requested Prescriptions     Pending Prescriptions Disp Refills    apixaban (Eliquis) 5 mg (74 tabs) tablet 60 tablet 3     Sig: Take 1 tablet (5 mg) by mouth 2 times a day.     Pt states xarelto on back order and needs eliquis until she can get xarelto

## 2024-12-18 ENCOUNTER — APPOINTMENT (OUTPATIENT)
Dept: OBSTETRICS AND GYNECOLOGY | Facility: CLINIC | Age: 34
End: 2024-12-18
Payer: COMMERCIAL

## 2024-12-18 ENCOUNTER — LAB (OUTPATIENT)
Dept: LAB | Facility: LAB | Age: 34
End: 2024-12-18
Payer: COMMERCIAL

## 2024-12-18 VITALS
SYSTOLIC BLOOD PRESSURE: 110 MMHG | HEIGHT: 64 IN | DIASTOLIC BLOOD PRESSURE: 80 MMHG | BODY MASS INDEX: 31.07 KG/M2 | WEIGHT: 182 LBS

## 2024-12-18 DIAGNOSIS — N92.1 MENORRHAGIA WITH IRREGULAR CYCLE: ICD-10-CM

## 2024-12-18 DIAGNOSIS — N92.1 MENORRHAGIA WITH IRREGULAR CYCLE: Primary | ICD-10-CM

## 2024-12-18 DIAGNOSIS — Z01.419 WELL WOMAN EXAM WITH ROUTINE GYNECOLOGICAL EXAM: ICD-10-CM

## 2024-12-18 DIAGNOSIS — Z97.5 IUD (INTRAUTERINE DEVICE) IN PLACE: ICD-10-CM

## 2024-12-18 LAB — TSH SERPL-ACNC: 1.64 MIU/L (ref 0.44–3.98)

## 2024-12-18 PROCEDURE — 87624 HPV HI-RISK TYP POOLED RSLT: CPT

## 2024-12-18 PROCEDURE — 87491 CHLMYD TRACH DNA AMP PROBE: CPT

## 2024-12-18 PROCEDURE — 88141 CYTOPATH C/V INTERPRET: CPT | Performed by: PATHOLOGY

## 2024-12-18 PROCEDURE — 84443 ASSAY THYROID STIM HORMONE: CPT

## 2024-12-18 PROCEDURE — 87591 N.GONORRHOEAE DNA AMP PROB: CPT

## 2024-12-18 PROCEDURE — 99213 OFFICE O/P EST LOW 20 MIN: CPT | Performed by: OBSTETRICS & GYNECOLOGY

## 2024-12-18 PROCEDURE — 3008F BODY MASS INDEX DOCD: CPT | Performed by: OBSTETRICS & GYNECOLOGY

## 2024-12-18 PROCEDURE — 36415 COLL VENOUS BLD VENIPUNCTURE: CPT

## 2024-12-18 PROCEDURE — 99395 PREV VISIT EST AGE 18-39: CPT | Performed by: OBSTETRICS & GYNECOLOGY

## 2024-12-18 PROCEDURE — 88175 CYTOPATH C/V AUTO FLUID REDO: CPT

## 2024-12-18 PROCEDURE — 1036F TOBACCO NON-USER: CPT | Performed by: OBSTETRICS & GYNECOLOGY

## 2024-12-18 ASSESSMENT — ENCOUNTER SYMPTOMS
DIZZINESS: 0
AGITATION: 0
ACTIVITY CHANGE: 0
COUGH: 0
APNEA: 0
JOINT SWELLING: 0

## 2024-12-19 LAB
C TRACH RRNA SPEC QL NAA+PROBE: NEGATIVE
N GONORRHOEA DNA SPEC QL PROBE+SIG AMP: NEGATIVE

## 2024-12-31 LAB
CYTOLOGY CMNT CVX/VAG CYTO-IMP: NORMAL
HPV HR 12 DNA GENITAL QL NAA+PROBE: POSITIVE
HPV HR GENOTYPES PNL CVX NAA+PROBE: POSITIVE
HPV16 DNA SPEC QL NAA+PROBE: NEGATIVE
HPV18 DNA SPEC QL NAA+PROBE: NEGATIVE
LAB AP HPV GENOTYPE QUESTION: YES
LAB AP HPV HR: NORMAL
LAB AP PAP ADDITIONAL TESTS: NORMAL
LABORATORY COMMENT REPORT: NORMAL
LMP START DATE: NORMAL
PATH REPORT.TOTAL CANCER: NORMAL

## 2025-01-01 PROBLEM — R87.610 ASCUS WITH POSITIVE HIGH RISK HPV CERVICAL: Status: ACTIVE | Noted: 2025-01-01

## 2025-01-01 PROBLEM — R87.810 ASCUS WITH POSITIVE HIGH RISK HPV CERVICAL: Status: ACTIVE | Noted: 2025-01-01

## 2025-01-02 ENCOUNTER — TELEPHONE (OUTPATIENT)
Dept: OBSTETRICS AND GYNECOLOGY | Facility: CLINIC | Age: 35
End: 2025-01-02
Payer: COMMERCIAL

## 2025-01-02 NOTE — TELEPHONE ENCOUNTER
Patient would like a call back to go over her results from her appointment with Dr. Hernandez please.

## 2025-01-03 ENCOUNTER — APPOINTMENT (OUTPATIENT)
Dept: OBSTETRICS AND GYNECOLOGY | Facility: CLINIC | Age: 35
End: 2025-01-03
Payer: COMMERCIAL

## 2025-01-25 ASSESSMENT — ENCOUNTER SYMPTOMS
VOMITING: 0
FEVER: 0
SORE THROAT: 0
ABDOMINAL PAIN: 0
CONSTIPATION: 1
CHILLS: 0
BACK PAIN: 0
HEADACHES: 0
HEMATURIA: 0
FREQUENCY: 0
ANOREXIA: 0
FLANK PAIN: 0
DYSURIA: 0
NAUSEA: 0
DIARRHEA: 0

## 2025-01-25 NOTE — PROGRESS NOTES
Patient ID: Chandan Villarreal is a 34 y.o. female.    Colposcopy    Date/Time: 1/30/2025 8:52 AM    Performed by: Orquidea Hernandez MD  Authorized by: Orquidea Hernandez MD    Procedure location: cervix    Consent:     Patient questions answered: yes      Risks and benefits of the procedure and its alternatives discussed: yes      Procedural risks discussed:  Bleeding, infection and repeat procedure    Consent obtained:  Written    Consent given by:  Patient  Indication:     Cervical indication(s): high-risk HPV positive and ASCUS    Pre-procedure:     Prep solution(s): acetic acid    Procedure:     Colposcopy with: cervical biopsy and endocervical curettage      Biopsy taken: yes      # of biopsies:  2    Biopsy location(s): ECC and 3:00    Colposcopy details:  Acetowhite changes at 3-6:00 along with marked ectropion.    Cervix visibility: fully visualized      SCJ visibility: fully visualized      Lesion visualized: fully visualized      Acetowhite lesion(s): cervix      Cervical impression: low grade      Ferric subsulfate solution applied: no      Tampon inserted: no    Post-procedure:     Patient tolerance of procedure:  Patient tolerated the procedure well with no immediate complications    Estimated blood loss (mL):  5    Instructions and paperwork completed: yes    Comments:      Significant bleeding was encountered with ECC and biopsy. This responded to pressure and silver nitrate. Sample from biopsy was very small.   Subjective   Patient ID: Chandan Villarreal is a 34 y.o. female who presents for Colposcopy.  12/18/2024 documentation:  She presents today for gyn visit. Last annual exam was on 6/29/2023 with Samanta Ya CNM.  She was seen in the ER on 12/10/2024 for moderate vaginal bleeding. She had expected menses followed by return of moderate vaginal bleeding a few days later. She states menses are typically monthly and last 3-4 days. This cycle she stopped menses then had return of bleeding off and  on for a few days, prompting her presentation to the ER. This was the first time she experienced irregular bleeding. Her history is significant for past pulmonary embolism and she is on anticoagulation. She had been off anticoagulation for 1.5 years but she restarted this in the summer due to getting another pulmonary embolism.  She also has a copper IUD for many years, with this last exchanged on 5/24/2021.    Her evaluation included examination with mild to moderate vaginal bleeding noted.   Labs returned without anemia on CBC, and normal results for CMP, CBC and negative HCG. TSH was in normal range on 7/19/2024.    Pelvic ultrasound 12/10/2024 was significant for possible adenomyosis, and the presence of IUD:  UTERUS:     The uterus is anteverted in position and measures 9.6 x 4.6 x 5.4 cm.  The uterine myometrium is heterogeneous in echotexture with multiple areas of linear striated shadowing.    ENDOMETRIUM:   The endometrium measures 0.7 cm. IUD is identified and appropriately positioned within the endometrium.     RIGHT OVARY:  The right ovary measures 3.0 x 2.0 x 2.6 cm.  The right ovary demonstrates a normal echotexture.  Spectral Doppler evaluation of the ovary was performed.  Normal color and spectral Doppler flow is visualized.    LEFT OVARY:  The left ovary measures 2.7 x 2.2 x 2.6 cm.  The left ovary demonstrates a normal echotexture.  Spectral Doppler evaluation of the ovary was performed.  Normal color and spectral Doppler flow is visualized.        We reviewed options of observation, removal of ParaGard IUD, replacement with progestin releasing IUD, systemic hormonal medication and surgical management including endometrial ablation and hysterectomy. She would need to be off anticoagulation prior to surgery and she hopes to avoid any surgery. Systemic hormonal medication is not recommended given increased risk of VTE. Since this is the first time she has had irregular menses in years, she desires to  observe for now. We discussed obtaining TSH now to assess for thyroid disorder contributing to new irregular bleeding.    We reviewed ultrasound with normal appearing endometrium, normal position of IUD, no evidence of fibroids. We did discuss potential for adenomyosis, which is not a concern unless causing heavy or painful menses.     1/30/2025:  TSH returned in normal range.  12/18/2024 pap returned with ASCUS, HPV positive. Colposcopy is planned today.    Female  Problem  The patient's pertinent negatives include no genital itching, genital lesions, genital odor, genital rash, missed menses, pelvic pain, vaginal bleeding or vaginal discharge. This is a new problem. The current episode started 1 to 4 weeks ago. The problem occurs constantly. The problem has been gradually improving. The patient is experiencing no pain. She is not pregnant. Associated symptoms include constipation. Pertinent negatives include no abdominal pain, anorexia, back pain, chills, diarrhea, discolored urine, dysuria, fever, flank pain, frequency, headaches, hematuria, joint pain, joint swelling, nausea, painful intercourse, rash, sore throat, urgency or vomiting. Nothing aggravates the symptoms. She is sexually active. No, her partner does not have an STD. She uses an IUD for contraception. Her menstrual history has been irregular.         Review of Systems   Constitutional:  Negative for chills and fever.   HENT:  Negative for sore throat.    Gastrointestinal:  Positive for constipation. Negative for abdominal pain, anorexia, diarrhea, nausea and vomiting.   Genitourinary:  Negative for dysuria, flank pain, frequency, hematuria, missed menses, pelvic pain, urgency and vaginal discharge.   Musculoskeletal:  Negative for back pain and joint pain.   Skin:  Negative for rash.   Neurological:  Negative for headaches.       Past Medical History:   Diagnosis Date    Acute sinusitis 06/13/2023    Chest pain, atypical 06/13/2023    Constipation  06/13/2023    Daily nausea 06/13/2023    Fatigue 06/13/2023    Injury of ankle and foot, left, initial encounter 06/13/2023    Irregular menses 06/13/2023    Pain in unspecified foot 12/17/2019    Foot pain    Personal history of pulmonary embolism 02/04/2021    History of pulmonary embolism    Pulmonary embolism September 2010/2024    Seasonal allergies 06/13/2023    Vaginal discharge 06/13/2023      Past Surgical History:   Procedure Laterality Date    OTHER SURGICAL HISTORY  09/16/2019    Appendectomy    OTHER SURGICAL HISTORY  12/15/2020    Foot surgery      Allergies   Allergen Reactions    Adhesive Tape-Silicones Rash      Current Outpatient Medications on File Prior to Visit   Medication Sig Dispense Refill    copper (ParaGard T 380A) 380 square mm IUD 1 each by intrauterine route 1 time.      cyanocobalamin (Vitamin B-12) 1,000 mcg tablet Take 1 tablet (1,000 mcg) by mouth once daily. 30 tablet 11    rivaroxaban (Xarelto) 20 mg tablet Take 1 tablet (20 mg) by mouth once daily. Take with food. 30 tablet 11    apixaban (Eliquis) 5 mg tablet Take 1 tablet (5 mg) by mouth 2 times a day for 14 days. (Patient not taking: Reported on 1/30/2025) 28 tablet 0     No current facility-administered medications on file prior to visit.        Objective   Physical Exam  Constitutional:       Appearance: Normal appearance.   Abdominal:      Palpations: Abdomen is soft.   Genitourinary:     General: Normal vulva.      Exam position: Lithotomy position.      Labia:         Right: No lesion.         Left: No lesion.       Urethra: No urethral lesion.      Vagina: Normal. No lesions.      Cervix: No friability or lesion.      Uterus: Normal. Not enlarged and not tender.       Adnexa: Right adnexa normal and left adnexa normal.        Right: No mass or tenderness.          Left: No mass or tenderness.     Skin:     General: Skin is warm and dry.   Neurological:      Mental Status: She is alert.           Problem List Items  Addressed This Visit       Menorrhagia with irregular cycle    Overview     She presented to ER 12/10/2024 with heavy menstrual bleeding. She has a ParaGard IUD for contraception and is on anticoagulation for history of Pulmonary Embolism first occurring during pregnancy.  12/10/2024 ultrasound shows heterogeneous myometrium with possibility of adenomyosis. IUD is in position and endometrium measures 0.7 cm.   TSH returned in normal range.         Current Assessment & Plan     She desires to try IUD exchange for Mirena to see if menses will be improved.         ASCUS with positive high risk HPV cervical - Primary    Overview     12/18/2024 pap returned ASCUS, HPV+. Colposcopy is planned.         Current Assessment & Plan     Colposcopy was performed today. We will await pathology report from biopsies. If there is no evidence of moderate or severe dysplasia on pathology we will plan to repeat pap in 12 months.   HPV vaccination may be beneficial in prevention and treatment of cervical dysplasia.  Using a condom may help prevent exposure to new HPV strains.  Avoiding nicotine exposure and adopting healthy habits such as regular exercise and a healthy diet are also recommended.

## 2025-01-30 ENCOUNTER — APPOINTMENT (OUTPATIENT)
Dept: OBSTETRICS AND GYNECOLOGY | Facility: CLINIC | Age: 35
End: 2025-01-30
Payer: COMMERCIAL

## 2025-01-30 VITALS — WEIGHT: 181 LBS | SYSTOLIC BLOOD PRESSURE: 108 MMHG | DIASTOLIC BLOOD PRESSURE: 78 MMHG | BODY MASS INDEX: 31.56 KG/M2

## 2025-01-30 DIAGNOSIS — R87.810 ASCUS WITH POSITIVE HIGH RISK HPV CERVICAL: Primary | ICD-10-CM

## 2025-01-30 DIAGNOSIS — N92.1 MENORRHAGIA WITH IRREGULAR CYCLE: ICD-10-CM

## 2025-01-30 DIAGNOSIS — R87.610 ASCUS WITH POSITIVE HIGH RISK HPV CERVICAL: Primary | ICD-10-CM

## 2025-01-30 PROCEDURE — 57454 BX/CURETT OF CERVIX W/SCOPE: CPT | Performed by: OBSTETRICS & GYNECOLOGY

## 2025-01-31 ENCOUNTER — TELEPHONE (OUTPATIENT)
Dept: OBSTETRICS AND GYNECOLOGY | Facility: CLINIC | Age: 35
End: 2025-01-31
Payer: COMMERCIAL

## 2025-02-05 ENCOUNTER — TELEPHONE (OUTPATIENT)
Dept: OBSTETRICS AND GYNECOLOGY | Facility: CLINIC | Age: 35
End: 2025-02-05
Payer: COMMERCIAL

## 2025-02-05 LAB
LABORATORY COMMENT REPORT: NORMAL
LABORATORY COMMENT REPORT: NORMAL
PATH REPORT.COMMENTS IMP SPEC: NORMAL
PATH REPORT.COMMENTS IMP SPEC: NORMAL
PATH REPORT.FINAL DX SPEC: NORMAL
PATH REPORT.FINAL DX SPEC: NORMAL
PATH REPORT.GROSS SPEC: NORMAL
PATH REPORT.GROSS SPEC: NORMAL
PATH REPORT.RELEVANT HX SPEC: NORMAL
PATH REPORT.RELEVANT HX SPEC: NORMAL
PATH REPORT.TOTAL CANCER: NORMAL
PATH REPORT.TOTAL CANCER: NORMAL

## 2025-02-05 NOTE — TELEPHONE ENCOUNTER
Patient called stating that she was told to see if the HPV injection was covered under her insurance and they told her that she needed to have [facility] send an acceptance letter to her insurance company ([employer]) stating why she needs the shot.  She also wants to know about another Pap.  Please advise.

## 2025-02-06 ENCOUNTER — TELEPHONE (OUTPATIENT)
Dept: CARDIOTHORACIC SURGERY | Facility: HOSPITAL | Age: 35
End: 2025-02-06
Payer: COMMERCIAL

## 2025-02-06 NOTE — TELEPHONE ENCOUNTER
Left message stating we are working on getting her CT scan scheduled 2/26 approved.  She needs update on PE status and preop clearance for potential surgery by Dr. Gandara before insurance will authorize scan. Letter sent to Rosanne Casal CNP for this information.  Possibility the OV and scan will have to be rescheduled.

## 2025-02-12 ENCOUNTER — TELEPHONE (OUTPATIENT)
Dept: OBSTETRICS AND GYNECOLOGY | Facility: CLINIC | Age: 35
End: 2025-02-12
Payer: COMMERCIAL

## 2025-02-12 NOTE — TELEPHONE ENCOUNTER
Shruthi. After talking with a couple different people, they said it would be better for them to just go to the health department. I called the patient and let her know.

## 2025-02-12 NOTE — TELEPHONE ENCOUNTER
Patient called and states that she called [employer] and that they said that yes, the HPV shot is covered, but they have to have a nurse or the doctor call them (not fax) to do a prior auth. Patient would like to know if this can be done please.

## 2025-02-13 ENCOUNTER — APPOINTMENT (OUTPATIENT)
Dept: PRIMARY CARE | Facility: CLINIC | Age: 35
End: 2025-02-13
Payer: COMMERCIAL

## 2025-02-13 ENCOUNTER — TELEPHONE (OUTPATIENT)
Dept: OBSTETRICS AND GYNECOLOGY | Facility: CLINIC | Age: 35
End: 2025-02-13

## 2025-02-13 NOTE — TELEPHONE ENCOUNTER
Patient called a couple times to see if the HPV shot is going to be authorized by insurance because she is supposed to get the injection next week.  Please advise.

## 2025-02-15 DIAGNOSIS — E55.9 VITAMIN D DEFICIENCY: ICD-10-CM

## 2025-02-15 NOTE — PROGRESS NOTES
Subjective   Patient ID: Chandan Villarreal is a 34 y.o. female who presents for No chief complaint on file..  She presents today for IUD exchange. She has had a copper IUD since 2021. She is on anticoagulation for history of pulmonary embolism and menses are now very heavy. She also had recent colposcopy with IVETTE I on biopsy. More than expected bleeding was encountered with cervical biopsy. She is hopeful IUD exchange will help decrease menstrual blood loss.           Review of Systems    Past Medical History:   Diagnosis Date    Acute sinusitis 06/13/2023    Chest pain, atypical 06/13/2023    Constipation 06/13/2023    Daily nausea 06/13/2023    Fatigue 06/13/2023    Injury of ankle and foot, left, initial encounter 06/13/2023    Irregular menses 06/13/2023    Pain in unspecified foot 12/17/2019    Foot pain    Personal history of pulmonary embolism 02/04/2021    History of pulmonary embolism    Pulmonary embolism September 2010/2024    Seasonal allergies 06/13/2023    Vaginal discharge 06/13/2023      Past Surgical History:   Procedure Laterality Date    OTHER SURGICAL HISTORY  09/16/2019    Appendectomy    OTHER SURGICAL HISTORY  12/15/2020    Foot surgery      Allergies   Allergen Reactions    Adhesive Tape-Silicones Rash      Current Outpatient Medications on File Prior to Visit   Medication Sig Dispense Refill    apixaban (Eliquis) 5 mg tablet Take 1 tablet (5 mg) by mouth 2 times a day for 14 days. (Patient not taking: Reported on 1/30/2025) 28 tablet 0    copper (ParaGard T 380A) 380 square mm IUD 1 each by intrauterine route 1 time.      cyanocobalamin (Vitamin B-12) 1,000 mcg tablet Take 1 tablet (1,000 mcg) by mouth once daily. 30 tablet 11    rivaroxaban (Xarelto) 20 mg tablet Take 1 tablet (20 mg) by mouth once daily. Take with food. 30 tablet 11     No current facility-administered medications on file prior to visit.        Objective   Physical Exam      Problem List Items Addressed This Visit    None

## 2025-02-17 RX ORDER — ERGOCALCIFEROL 1.25 MG/1
1.25 CAPSULE ORAL
Qty: 4 CAPSULE | Refills: 2 | Status: SHIPPED | OUTPATIENT
Start: 2025-02-23 | End: 2025-05-18

## 2025-02-19 ENCOUNTER — APPOINTMENT (OUTPATIENT)
Dept: OBSTETRICS AND GYNECOLOGY | Facility: CLINIC | Age: 35
End: 2025-02-19
Payer: COMMERCIAL

## 2025-02-19 DIAGNOSIS — N92.1 MENORRHAGIA WITH IRREGULAR CYCLE: ICD-10-CM

## 2025-02-19 DIAGNOSIS — Z97.5 IUD (INTRAUTERINE DEVICE) IN PLACE: Primary | ICD-10-CM

## 2025-02-26 ENCOUNTER — APPOINTMENT (OUTPATIENT)
Dept: RADIOLOGY | Facility: HOSPITAL | Age: 35
End: 2025-02-26
Payer: COMMERCIAL

## 2025-02-26 ENCOUNTER — APPOINTMENT (OUTPATIENT)
Dept: SURGERY | Facility: HOSPITAL | Age: 35
End: 2025-02-26
Payer: COMMERCIAL

## 2025-03-12 ENCOUNTER — APPOINTMENT (OUTPATIENT)
Dept: RADIOLOGY | Facility: HOSPITAL | Age: 35
End: 2025-03-12
Payer: COMMERCIAL

## 2025-03-12 ENCOUNTER — APPOINTMENT (OUTPATIENT)
Dept: SURGERY | Facility: HOSPITAL | Age: 35
End: 2025-03-12
Payer: COMMERCIAL

## 2025-03-19 ENCOUNTER — APPOINTMENT (OUTPATIENT)
Dept: RADIOLOGY | Facility: HOSPITAL | Age: 35
End: 2025-03-19
Payer: COMMERCIAL

## 2025-03-20 DIAGNOSIS — K22.89 ESOPHAGEAL MASS: ICD-10-CM

## 2025-03-27 ENCOUNTER — TELEPHONE (OUTPATIENT)
Dept: GASTROENTEROLOGY | Facility: HOSPITAL | Age: 35
End: 2025-03-27
Payer: COMMERCIAL

## 2025-04-01 ENCOUNTER — APPOINTMENT (OUTPATIENT)
Dept: PRIMARY CARE | Facility: CLINIC | Age: 35
End: 2025-04-01
Payer: COMMERCIAL

## 2025-04-01 VITALS
BODY MASS INDEX: 31.07 KG/M2 | OXYGEN SATURATION: 98 % | SYSTOLIC BLOOD PRESSURE: 118 MMHG | DIASTOLIC BLOOD PRESSURE: 74 MMHG | HEART RATE: 85 BPM | HEIGHT: 64 IN | WEIGHT: 182 LBS | RESPIRATION RATE: 16 BRPM

## 2025-04-01 DIAGNOSIS — G43.709 CHRONIC MIGRAINE WITHOUT AURA WITHOUT STATUS MIGRAINOSUS, NOT INTRACTABLE: ICD-10-CM

## 2025-04-01 DIAGNOSIS — K21.9 GASTROESOPHAGEAL REFLUX DISEASE WITHOUT ESOPHAGITIS: Primary | ICD-10-CM

## 2025-04-01 DIAGNOSIS — E53.8 B12 DEFICIENCY: ICD-10-CM

## 2025-04-01 DIAGNOSIS — E55.9 VITAMIN D DEFICIENCY: ICD-10-CM

## 2025-04-01 DIAGNOSIS — Z00.00 ROUTINE GENERAL MEDICAL EXAMINATION AT A HEALTH CARE FACILITY: ICD-10-CM

## 2025-04-01 DIAGNOSIS — K22.89 ESOPHAGEAL MASS: ICD-10-CM

## 2025-04-01 DIAGNOSIS — I26.92 ACUTE SADDLE PULMONARY EMBOLISM WITHOUT ACUTE COR PULMONALE (MULTI): ICD-10-CM

## 2025-04-01 PROCEDURE — 1036F TOBACCO NON-USER: CPT | Performed by: FAMILY MEDICINE

## 2025-04-01 PROCEDURE — 99395 PREV VISIT EST AGE 18-39: CPT | Performed by: FAMILY MEDICINE

## 2025-04-01 PROCEDURE — 3008F BODY MASS INDEX DOCD: CPT | Performed by: FAMILY MEDICINE

## 2025-04-01 ASSESSMENT — PATIENT HEALTH QUESTIONNAIRE - PHQ9
SUM OF ALL RESPONSES TO PHQ9 QUESTIONS 1 AND 2: 0
1. LITTLE INTEREST OR PLEASURE IN DOING THINGS: NOT AT ALL
2. FEELING DOWN, DEPRESSED OR HOPELESS: NOT AT ALL

## 2025-04-01 ASSESSMENT — ANXIETY QUESTIONNAIRES
7. FEELING AFRAID AS IF SOMETHING AWFUL MIGHT HAPPEN: NOT AT ALL
GAD7 TOTAL SCORE: 0
IF YOU CHECKED OFF ANY PROBLEMS ON THIS QUESTIONNAIRE, HOW DIFFICULT HAVE THESE PROBLEMS MADE IT FOR YOU TO DO YOUR WORK, TAKE CARE OF THINGS AT HOME, OR GET ALONG WITH OTHER PEOPLE: NOT DIFFICULT AT ALL
3. WORRYING TOO MUCH ABOUT DIFFERENT THINGS: NOT AT ALL
5. BEING SO RESTLESS THAT IT IS HARD TO SIT STILL: NOT AT ALL
1. FEELING NERVOUS, ANXIOUS, OR ON EDGE: NOT AT ALL
6. BECOMING EASILY ANNOYED OR IRRITABLE: NOT AT ALL
2. NOT BEING ABLE TO STOP OR CONTROL WORRYING: NOT AT ALL
4. TROUBLE RELAXING: NOT AT ALL

## 2025-04-01 ASSESSMENT — ENCOUNTER SYMPTOMS
LOSS OF SENSATION IN FEET: 0
OCCASIONAL FEELINGS OF UNSTEADINESS: 0
DEPRESSION: 0

## 2025-04-01 ASSESSMENT — COLUMBIA-SUICIDE SEVERITY RATING SCALE - C-SSRS
2. HAVE YOU ACTUALLY HAD ANY THOUGHTS OF KILLING YOURSELF?: NO
1. IN THE PAST MONTH, HAVE YOU WISHED YOU WERE DEAD OR WISHED YOU COULD GO TO SLEEP AND NOT WAKE UP?: NO
6. HAVE YOU EVER DONE ANYTHING, STARTED TO DO ANYTHING, OR PREPARED TO DO ANYTHING TO END YOUR LIFE?: NO

## 2025-04-01 NOTE — PATIENT INSTRUCTIONS
Healthy female exam. 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 Call them back to see about another CT and may EGD to look at mass from another angle.    Labs ordered

## 2025-04-01 NOTE — PROGRESS NOTES
Subjective   Chandan Villarreal is a 35 y.o. female and is here for a comprehensive physical exam. The patient reports problems - Eliquis gave her heavy periods and switched to Xarelto, and is doing well.   Last physical:   Changes no  Concerns yes Has mass in chest  Dentist yes  Vision yes  Hearing Problems no  Diet yes  Exercise yes  Alcohol no  Drugs no  Social History     Tobacco Use   Smoking Status Never   Smokeless Tobacco Never          Do you take any herbs or supplements that were not prescribed by a doctor? no  Are you taking calcium supplements? no  Are you taking aspirin daily? no      History:  LMP: Regular on IUD  Menopause at n /a years  Last pap date:   Abnormal pap? no  : 2  Para: 2    Do you have pain that bothers you in your daily life? no  Review of Systems   All other systems reviewed and are negative.       Objective   Physical Exam  Vitals reviewed.   Constitutional:       Appearance: Normal appearance.   HENT:      Head: Normocephalic and atraumatic.      Right Ear: Tympanic membrane normal.      Left Ear: Tympanic membrane normal.      Nose: Nose normal.      Mouth/Throat:      Mouth: Mucous membranes are moist.      Pharynx: Oropharynx is clear.   Eyes:      Extraocular Movements: Extraocular movements intact.      Conjunctiva/sclera: Conjunctivae normal.      Pupils: Pupils are equal, round, and reactive to light.   Cardiovascular:      Rate and Rhythm: Normal rate and regular rhythm.      Pulses: Normal pulses.      Heart sounds: Normal heart sounds.   Pulmonary:      Effort: Pulmonary effort is normal.      Breath sounds: Normal breath sounds.   Abdominal:      General: Abdomen is flat. Bowel sounds are normal.      Palpations: Abdomen is soft.   Musculoskeletal:         General: Normal range of motion.      Cervical back: Normal range of motion and neck supple.   Skin:     General: Skin is warm and dry.      Capillary Refill: Capillary refill takes less than 2 seconds.    Neurological:      General: No focal deficit present.      Mental Status: She is alert and oriented to person, place, and time.   Psychiatric:         Mood and Affect: Mood normal.         Behavior: Behavior normal.         Assessment/Plan   Healthy female exam. 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 Call them back to see about another CT and may EGD to look at mass from another angle.    1. Labs ordered  2. Patient Counseling:  --Nutrition: Stressed importance of moderation in sodium/caffeine intake, saturated fat and cholesterol, caloric balance, sufficient intake of fresh fruits, vegetables, fiber, calcium, iron, and 1 mg of folate supplement per day (for females capable of pregnancy).  --Discussed the issue of estrogen replacement, calcium supplement, and the daily use of baby aspirin.  --Exercise: Stressed the importance of regular exercise.   --Substance Abuse: Discussed cessation/primary prevention of tobacco, alcohol, or other drug use; driving or other dangerous activities under the influence; availability of treatment for abuse.    --Sexuality: Discussed sexually transmitted diseases, partner selection, use of condoms, avoidance of unintended pregnancy  and contraceptive alternatives.   --Injury prevention: Discussed safety belts, safety helmets, smoke detector, smoking near bedding or upholstery.   --Dental health: Discussed importance of regular tooth brushing, flossing, and dental visits.  --Immunizations reviewed.  --Discussed benefits of screening colonoscopy.  --After hours service discussed with patient  3. Discussed the patient's BMI with her.  The BMI is above average. The patient received discussion because they have an above normal BMI.  4. Follow up in one year

## 2025-04-02 ENCOUNTER — TELEPHONE (OUTPATIENT)
Dept: GASTROENTEROLOGY | Facility: HOSPITAL | Age: 35
End: 2025-04-02
Payer: COMMERCIAL

## 2025-04-03 ENCOUNTER — ANESTHESIA (OUTPATIENT)
Dept: GASTROENTEROLOGY | Facility: HOSPITAL | Age: 35
End: 2025-04-03
Payer: COMMERCIAL

## 2025-04-03 ENCOUNTER — ANESTHESIA EVENT (OUTPATIENT)
Dept: GASTROENTEROLOGY | Facility: HOSPITAL | Age: 35
End: 2025-04-03

## 2025-04-03 ENCOUNTER — APPOINTMENT (OUTPATIENT)
Dept: GASTROENTEROLOGY | Facility: HOSPITAL | Age: 35
End: 2025-04-03
Payer: COMMERCIAL

## 2025-04-04 ENCOUNTER — TELEPHONE (OUTPATIENT)
Dept: GASTROENTEROLOGY | Facility: HOSPITAL | Age: 35
End: 2025-04-04
Payer: COMMERCIAL

## 2025-04-21 ENCOUNTER — ANESTHESIA EVENT (OUTPATIENT)
Dept: GASTROENTEROLOGY | Facility: HOSPITAL | Age: 35
End: 2025-04-21

## 2025-04-21 NOTE — H&P
History Of Present Illness  Chandan Villarreal is a 35 y.o. female presenting with esophageal mass.     Past Medical History  Medical History[1]  Surgical History  Surgical History[2]  Social History  She reports that she has never smoked. She has never used smokeless tobacco. She reports that she does not drink alcohol and does not use drugs.    Family History  Family History[3]     Allergies  Allergies[4]  Review of Systems     Physical Exam     Last Recorded Vitals  There were no vitals taken for this visit.    Assessment/Plan   Esophageal mass    Proceed with EUS     Michael Howell MD       [1]   Past Medical History:  Diagnosis Date    Acute sinusitis 06/13/2023    Chest pain, atypical 06/13/2023    Constipation 06/13/2023    Daily nausea 06/13/2023    Fatigue 06/13/2023    Injury of ankle and foot, left, initial encounter 06/13/2023    Irregular menses 06/13/2023    Pain in unspecified foot 12/17/2019    Foot pain    Personal history of pulmonary embolism 02/04/2021    History of pulmonary embolism    Pulmonary embolism September 2010/2024    Seasonal allergies 06/13/2023    Vaginal discharge 06/13/2023   [2]   Past Surgical History:  Procedure Laterality Date    OTHER SURGICAL HISTORY  09/16/2019    Appendectomy    OTHER SURGICAL HISTORY  12/15/2020    Foot surgery   [3]   Family History  Problem Relation Name Age of Onset    Cancer Father's Sister Cristian patelll     Osteoporosis Father's Sister Cristian patelll     Alcohol abuse Father Cristian MARTIN     Cancer Father Cristian MARTIN    [4]   Allergies  Allergen Reactions    Adhesive Tape-Silicones Rash

## 2025-04-21 NOTE — ANESTHESIA PREPROCEDURE EVALUATION
Patient: Chandan URIARTE [place]    Procedure Information       Date/Time: 04/22/25 7986    Scheduled providers: Michael Howell MD    Procedure: ENDOSCOPIC ULTRASOUND (UPPER)    Location: [facility]            Relevant Problems   Cardiac   (+) Chest pain on exertion   (+) Pain of right scapula   (+) Rib pain on left side      Pulmonary   (+) Acute saddle pulmonary embolism without acute cor pulmonale (Multi)   (+) Recurrent pulmonary emboli (Multi)      Neuro   (+) Anxiety      GI   (+) GERD (gastroesophageal reflux disease)      Hematology   (+) DVT (deep vein thrombosis) in pregnancy ([facility])      ID   (+) Bacterial vaginosis      GYN   (+) Menorrhagia with irregular cycle       Clinical information reviewed:                   NPO Detail:  No data recorded     PHYSICAL EXAM    Anesthesia Plan

## 2025-04-22 ENCOUNTER — ANESTHESIA (OUTPATIENT)
Dept: GASTROENTEROLOGY | Facility: HOSPITAL | Age: 35
End: 2025-04-22
Payer: COMMERCIAL

## 2025-04-22 ENCOUNTER — APPOINTMENT (OUTPATIENT)
Dept: GASTROENTEROLOGY | Facility: HOSPITAL | Age: 35
End: 2025-04-22
Payer: COMMERCIAL

## 2025-05-01 LAB
25(OH)D3+25(OH)D2 SERPL-MCNC: 77 NG/ML (ref 30–100)
ALBUMIN SERPL-MCNC: 4.4 G/DL (ref 3.6–5.1)
ALP SERPL-CCNC: 63 U/L (ref 31–125)
ALT SERPL-CCNC: 9 U/L (ref 6–29)
ANION GAP SERPL CALCULATED.4IONS-SCNC: 7 MMOL/L (CALC) (ref 7–17)
AST SERPL-CCNC: 12 U/L (ref 10–30)
BASOPHILS # BLD AUTO: 72 CELLS/UL (ref 0–200)
BASOPHILS NFR BLD AUTO: 0.9 %
BILIRUB SERPL-MCNC: 0.4 MG/DL (ref 0.2–1.2)
BUN SERPL-MCNC: 9 MG/DL (ref 7–25)
CALCIUM SERPL-MCNC: 9.1 MG/DL (ref 8.6–10.2)
CHLORIDE SERPL-SCNC: 106 MMOL/L (ref 98–110)
CHOLEST SERPL-MCNC: 195 MG/DL
CHOLEST/HDLC SERPL: 3.5 (CALC)
CO2 SERPL-SCNC: 25 MMOL/L (ref 20–32)
CREAT SERPL-MCNC: 0.6 MG/DL (ref 0.5–0.97)
EGFRCR SERPLBLD CKD-EPI 2021: 121 ML/MIN/1.73M2
EOSINOPHIL # BLD AUTO: 152 CELLS/UL (ref 15–500)
EOSINOPHIL NFR BLD AUTO: 1.9 %
ERYTHROCYTE [DISTWIDTH] IN BLOOD BY AUTOMATED COUNT: 12.3 % (ref 11–15)
GLUCOSE SERPL-MCNC: 91 MG/DL (ref 65–99)
HCT VFR BLD AUTO: 41.3 % (ref 35–45)
HDLC SERPL-MCNC: 56 MG/DL
HGB BLD-MCNC: 13.9 G/DL (ref 11.7–15.5)
LDLC SERPL CALC-MCNC: 117 MG/DL (CALC)
LYMPHOCYTES # BLD AUTO: 2056 CELLS/UL (ref 850–3900)
LYMPHOCYTES NFR BLD AUTO: 25.7 %
MCH RBC QN AUTO: 29.1 PG (ref 27–33)
MCHC RBC AUTO-ENTMCNC: 33.7 G/DL (ref 32–36)
MCV RBC AUTO: 86.6 FL (ref 80–100)
MONOCYTES # BLD AUTO: 592 CELLS/UL (ref 200–950)
MONOCYTES NFR BLD AUTO: 7.4 %
NEUTROPHILS # BLD AUTO: 5128 CELLS/UL (ref 1500–7800)
NEUTROPHILS NFR BLD AUTO: 64.1 %
NONHDLC SERPL-MCNC: 139 MG/DL (CALC)
PLATELET # BLD AUTO: 249 THOUSAND/UL (ref 140–400)
PMV BLD REES-ECKER: 10.2 FL (ref 7.5–12.5)
POTASSIUM SERPL-SCNC: 4.3 MMOL/L (ref 3.5–5.3)
PROT SERPL-MCNC: 6.5 G/DL (ref 6.1–8.1)
RBC # BLD AUTO: 4.77 MILLION/UL (ref 3.8–5.1)
SODIUM SERPL-SCNC: 138 MMOL/L (ref 135–146)
TRIGL SERPL-MCNC: 111 MG/DL
TSH SERPL-ACNC: 3.24 MIU/L
VIT B12 SERPL-MCNC: 750 PG/ML (ref 200–1100)
WBC # BLD AUTO: 8 THOUSAND/UL (ref 3.8–10.8)

## 2025-05-14 DIAGNOSIS — E55.9 VITAMIN D DEFICIENCY: ICD-10-CM

## 2025-05-14 RX ORDER — ERGOCALCIFEROL 1.25 MG/1
1.25 CAPSULE ORAL
Qty: 4 CAPSULE | Refills: 11 | Status: SHIPPED | OUTPATIENT
Start: 2025-05-18 | End: 2026-05-18

## 2025-05-20 ENCOUNTER — ANESTHESIA (OUTPATIENT)
Dept: GASTROENTEROLOGY | Facility: HOSPITAL | Age: 35
End: 2025-05-20
Payer: COMMERCIAL

## 2025-05-20 ENCOUNTER — ANESTHESIA EVENT (OUTPATIENT)
Dept: GASTROENTEROLOGY | Facility: HOSPITAL | Age: 35
End: 2025-05-20
Payer: COMMERCIAL

## 2025-05-20 ENCOUNTER — HOSPITAL ENCOUNTER (OUTPATIENT)
Dept: GASTROENTEROLOGY | Facility: HOSPITAL | Age: 35
Discharge: HOME | End: 2025-05-20
Payer: COMMERCIAL

## 2025-05-20 VITALS
HEIGHT: 63 IN | SYSTOLIC BLOOD PRESSURE: 101 MMHG | TEMPERATURE: 97.7 F | RESPIRATION RATE: 15 BRPM | OXYGEN SATURATION: 98 % | HEART RATE: 80 BPM | BODY MASS INDEX: 32.42 KG/M2 | DIASTOLIC BLOOD PRESSURE: 71 MMHG | WEIGHT: 182.98 LBS

## 2025-05-20 DIAGNOSIS — K22.89 ESOPHAGEAL MASS: ICD-10-CM

## 2025-05-20 LAB — PREGNANCY TEST URINE, POC: NEGATIVE

## 2025-05-20 PROCEDURE — 3700000002 HC GENERAL ANESTHESIA TIME - EACH INCREMENTAL 1 MINUTE

## 2025-05-20 PROCEDURE — 81025 URINE PREGNANCY TEST: CPT | Performed by: INTERNAL MEDICINE

## 2025-05-20 PROCEDURE — 7100000009 HC PHASE TWO TIME - INITIAL BASE CHARGE

## 2025-05-20 PROCEDURE — 43237 ENDOSCOPIC US EXAM ESOPH: CPT | Performed by: INTERNAL MEDICINE

## 2025-05-20 PROCEDURE — 7100000010 HC PHASE TWO TIME - EACH INCREMENTAL 1 MINUTE

## 2025-05-20 PROCEDURE — 3700000001 HC GENERAL ANESTHESIA TIME - INITIAL BASE CHARGE

## 2025-05-20 PROCEDURE — 2500000004 HC RX 250 GENERAL PHARMACY W/ HCPCS (ALT 636 FOR OP/ED): Mod: JZ | Performed by: NURSE ANESTHETIST, CERTIFIED REGISTERED

## 2025-05-20 RX ORDER — ONDANSETRON HYDROCHLORIDE 2 MG/ML
INJECTION, SOLUTION INTRAVENOUS AS NEEDED
Status: DISCONTINUED | OUTPATIENT
Start: 2025-05-20 | End: 2025-05-20

## 2025-05-20 RX ORDER — PROPOFOL 10 MG/ML
INJECTION, EMULSION INTRAVENOUS AS NEEDED
Status: DISCONTINUED | OUTPATIENT
Start: 2025-05-20 | End: 2025-05-20

## 2025-05-20 RX ORDER — FENTANYL CITRATE 50 UG/ML
INJECTION, SOLUTION INTRAMUSCULAR; INTRAVENOUS AS NEEDED
Status: DISCONTINUED | OUTPATIENT
Start: 2025-05-20 | End: 2025-05-20

## 2025-05-20 RX ORDER — MIDAZOLAM HYDROCHLORIDE 1 MG/ML
INJECTION, SOLUTION INTRAMUSCULAR; INTRAVENOUS AS NEEDED
Status: DISCONTINUED | OUTPATIENT
Start: 2025-05-20 | End: 2025-05-20

## 2025-05-20 RX ADMIN — PROPOFOL 20 MG: 10 INJECTION, EMULSION INTRAVENOUS at 10:36

## 2025-05-20 RX ADMIN — FENTANYL CITRATE 100 MCG: 50 INJECTION, SOLUTION INTRAMUSCULAR; INTRAVENOUS at 10:26

## 2025-05-20 RX ADMIN — PROPOFOL 30 MG: 10 INJECTION, EMULSION INTRAVENOUS at 10:35

## 2025-05-20 RX ADMIN — PROPOFOL 20 MG: 10 INJECTION, EMULSION INTRAVENOUS at 10:29

## 2025-05-20 RX ADMIN — SODIUM CHLORIDE, SODIUM LACTATE, POTASSIUM CHLORIDE, AND CALCIUM CHLORIDE: .6; .31; .03; .02 INJECTION, SOLUTION INTRAVENOUS at 10:22

## 2025-05-20 RX ADMIN — MIDAZOLAM HYDROCHLORIDE 2 MG: 1 INJECTION, SOLUTION INTRAMUSCULAR; INTRAVENOUS at 10:26

## 2025-05-20 RX ADMIN — PROPOFOL 100 MG: 10 INJECTION, EMULSION INTRAVENOUS at 10:28

## 2025-05-20 RX ADMIN — PROPOFOL 30 MG: 10 INJECTION, EMULSION INTRAVENOUS at 10:33

## 2025-05-20 RX ADMIN — ONDANSETRON 4 MG: 2 INJECTION INTRAMUSCULAR; INTRAVENOUS at 10:37

## 2025-05-20 SDOH — HEALTH STABILITY: MENTAL HEALTH: CURRENT SMOKER: 0

## 2025-05-20 ASSESSMENT — ENCOUNTER SYMPTOMS: CONSTITUTIONAL NEGATIVE: 1

## 2025-05-20 ASSESSMENT — COLUMBIA-SUICIDE SEVERITY RATING SCALE - C-SSRS
1. IN THE PAST MONTH, HAVE YOU WISHED YOU WERE DEAD OR WISHED YOU COULD GO TO SLEEP AND NOT WAKE UP?: NO
2. HAVE YOU ACTUALLY HAD ANY THOUGHTS OF KILLING YOURSELF?: NO
6. HAVE YOU EVER DONE ANYTHING, STARTED TO DO ANYTHING, OR PREPARED TO DO ANYTHING TO END YOUR LIFE?: NO

## 2025-05-20 ASSESSMENT — PAIN SCALES - GENERAL
PAINLEVEL_OUTOF10: 0 - NO PAIN
PAIN_LEVEL: 0
PAINLEVEL_OUTOF10: 0 - NO PAIN

## 2025-05-20 ASSESSMENT — PAIN - FUNCTIONAL ASSESSMENT: PAIN_FUNCTIONAL_ASSESSMENT: 0-10

## 2025-05-20 NOTE — DISCHARGE INSTRUCTIONS

## 2025-05-20 NOTE — ANESTHESIA PREPROCEDURE EVALUATION
"Patient: Chandan URIARTE [place]    Procedure Information       Date/Time: 05/20/25 1030    Scheduled providers: Ron Slaughter DO; Laxmi Zhou MD    Procedure: ENDOSCOPIC ULTRASOUND (UPPER)    [place] [facility] [place]            Relevant Problems   Cardiac   (+) Chest pain on exertion   (+) Pain of right scapula   (+) Rib pain on left side      Pulmonary   (+) Acute saddle pulmonary embolism without acute cor pulmonale (Multi)   (+) Recurrent pulmonary emboli (Multi)      Neuro   (+) Anxiety      GI  Mass behind her esophagus   (+) GERD (gastroesophageal reflux disease)      Hematology   (+) DVT (deep vein thrombosis) in pregnancy ([facility])      ID   (+) Bacterial vaginosis      GYN   (+) Menorrhagia with irregular cycle       Clinical information reviewed:   Tobacco  Allergies  Meds   Med Hx  Surg Hx  OB Status  Fam Hx  Soc   Hx         Medical History[1]   Surgical History[2]  Social History[3]   Current Outpatient Medications   Medication Instructions    copper (ParaGard T 380A) 380 square mm IUD 1 each, Once    cyanocobalamin (VITAMIN B-12) 1,000 mcg, oral, Daily    ergocalciferol (VITAMIN D-2) 1.25 mg, oral, Once Weekly    rivaroxaban (XARELTO) 20 mg, oral, Daily, Take with food.      RX Allergies[4]     Chemistry    Lab Results   Component Value Date/Time     04/30/2025 1415    K 4.3 04/30/2025 1415     04/30/2025 1415    CO2 25 04/30/2025 1415    BUN 9 04/30/2025 1415    CREATININE 0.60 04/30/2025 1415    Lab Results   Component Value Date/Time    CALCIUM 9.1 04/30/2025 1415    ALKPHOS 63 04/30/2025 1415    AST 12 04/30/2025 1415    ALT 9 04/30/2025 1415    BILITOT 0.4 04/30/2025 1415          No results found for: \"HGBA1C\"  Lab Results   Component Value Date/Time    WBC 8.0 04/30/2025 1415    HGB 13.9 04/30/2025 1415    HCT 41.3 04/30/2025 1415     04/30/2025 1415     Lab Results   Component Value Date/Time    PROTIME 14.3 (H) 12/10/2024 1931    INR 1.3 (H) 12/10/2024 1931 " "    No results found for: \"ABORH\"  No results found for this or any previous visit (from the past 4464 hours).  No results found for this or any previous visit from the past 1095 days.       Visit Vitals  /89   Pulse 88   Temp 36.4 °C (97.5 °F) (Temporal)   Resp 16   Ht 1.6 m (5' 3\")   Wt 83 kg (182 lb 15.7 oz)   LMP 2025 (Exact Date)   SpO2 99%   BMI 32.41 kg/m²   OB Status Having periods   Smoking Status Never   BSA 1.92 m²     NPO/Void Status  Carbohydrate Drink Given Prior to Surgery? : Y  Date of Last Liquid: 25  Time of Last Liquid: 200  Date of Last Solid: 25  Time of Last Solid:   Last Intake Type: Clear fluids  Time of Last Void: 900        Physical Exam    Airway  Mallampati: III  TM distance: <3 FB  Neck ROM: limited  Mouth openin finger widths     Cardiovascular   Rhythm: regular     Dental - normal exam     Pulmonary Breath sounds clear to auscultation     Abdominal            Anesthesia Plan    History of general anesthesia?: yes  History of complications of general anesthesia?: no    ASA 3     MAC     The patient is not a current smoker.    intravenous induction   Anesthetic plan and risks discussed with patient.    Plan discussed with CRNA and CAA.              [1]   Past Medical History:  Diagnosis Date    Acute sinusitis 2023    Chest pain, atypical 2023    Constipation 2023    Daily nausea 2023    Fatigue 2023    Injury of ankle and foot, left, initial encounter 2023    Irregular menses 2023    Pain in unspecified foot 2019    Foot pain    Personal history of pulmonary embolism 2021    History of pulmonary embolism    Pulmonary embolism     Seasonal allergies 2023    Vaginal discharge 2023   [2]   Past Surgical History:  Procedure Laterality Date    OTHER SURGICAL HISTORY  2019    Appendectomy    OTHER SURGICAL HISTORY  12/15/2020    Foot surgery   [3]   Social " History  Tobacco Use    Smoking status: Never    Smokeless tobacco: Never   Vaping Use    Vaping status: Never Used   Substance Use Topics    Alcohol use: Never    Drug use: Never   [4]   Allergies  Allergen Reactions    Adhesive Tape-Silicones Rash

## 2025-05-20 NOTE — ANESTHESIA POSTPROCEDURE EVALUATION
Patient: Chandan [place]    Procedure Summary       Date: 05/20/25 Room / Location: [facility]    Anesthesia Start: 1018 Anesthesia Stop: 1044    Procedure: ENDOSCOPIC ULTRASOUND (UPPER) Diagnosis: Esophageal mass    Scheduled Providers: Ron Slaughter DO; Laxmi Zhou MD Responsible Provider: Laxmi Zhou MD    Anesthesia Type: MAC ASA Status: 3            Anesthesia Type: MAC    Vitals Value Taken Time   /71 05/20/25 11:13   Temp 36.5 °C (97.7 °F) 05/20/25 10:43   Pulse 80 05/20/25 11:13   Resp 15 05/20/25 11:13   SpO2 98 % 05/20/25 11:13       Anesthesia Post Evaluation    Patient location during evaluation: bedside  Patient participation: complete - patient participated  Level of consciousness: awake  Pain score: 0  Pain management: adequate  Airway patency: patent  Cardiovascular status: hemodynamically stable  Respiratory status: acceptable  Hydration status: acceptable  Postoperative Nausea and Vomiting: none        No notable events documented.

## 2025-05-20 NOTE — H&P
"History Of Present Illness  Chandan Villarreal is a 35 y.o. female presenting with an esophageal or periesopahgeal lesion on CT scan that is appears benign on PET imaging here for her first EGD with EUS.  This was found during an acute vascular life threatening saddle thrombosis back in July 2024.  She has no heartburn or dysphagia.  She is accompanied by her significant other.       Past Medical History  Medical History[1]  Surgical History  Surgical History[2]  Social History  She reports that she has never smoked. She has never used smokeless tobacco. She reports that she does not drink alcohol and does not use drugs.    Family History  Family History[3]     Allergies  Allergies[4]  Review of Systems   Constitutional: Negative.         Physical Exam  Constitutional:       Appearance: Normal appearance. She is normal weight.   Pulmonary:      Effort: Pulmonary effort is normal.      Breath sounds: Normal breath sounds.   Abdominal:      General: Abdomen is flat.      Palpations: Abdomen is soft.   Neurological:      Mental Status: She is alert.   Psychiatric:         Mood and Affect: Mood normal.         Behavior: Behavior normal.         Thought Content: Thought content normal.         Judgment: Judgment normal.          Last Recorded Vitals  Blood pressure 123/89, pulse 88, temperature 36.4 °C (97.5 °F), temperature source Temporal, resp. rate 16, height 1.6 m (5' 3\"), weight 83 kg (182 lb 15.7 oz), last menstrual period 05/18/2025, SpO2 99%.    Assessment/Plan   EGD with EUS of abnormal CT scan of the esophagus     Ron Slaughter DO       [1]   Past Medical History:  Diagnosis Date    Acute sinusitis 06/13/2023    Chest pain, atypical 06/13/2023    Constipation 06/13/2023    Daily nausea 06/13/2023    Fatigue 06/13/2023    Injury of ankle and foot, left, initial encounter 06/13/2023    Irregular menses 06/13/2023    Pain in unspecified foot 12/17/2019    Foot pain    Personal history of pulmonary embolism " 02/04/2021    History of pulmonary embolism    Pulmonary embolism September 2010/2024    Seasonal allergies 06/13/2023    Vaginal discharge 06/13/2023   [2]   Past Surgical History:  Procedure Laterality Date    OTHER SURGICAL HISTORY  09/16/2019    Appendectomy    OTHER SURGICAL HISTORY  12/15/2020    Foot surgery   [3]   Family History  Problem Relation Name Age of Onset    Cancer Father's Sister Cristian gala     Osteoporosis Father's Sister Cristian cedeño     Alcohol abuse Father Cristian MARTIN     Cancer Father Cristian MARTIN    [4]   Allergies  Allergen Reactions    Adhesive Tape-Silicones Rash

## 2025-06-27 ENCOUNTER — TELEPHONE (OUTPATIENT)
Dept: PRIMARY CARE | Facility: CLINIC | Age: 35
End: 2025-06-27
Payer: COMMERCIAL

## 2025-06-27 NOTE — TELEPHONE ENCOUNTER
Patient wants to know if she needs to be seen because she has a lot of hiccups? It happens if she eats or not.  [facility] [place]

## 2025-07-10 DIAGNOSIS — K21.9 GASTROESOPHAGEAL REFLUX DISEASE WITHOUT ESOPHAGITIS: Primary | ICD-10-CM

## 2025-07-10 RX ORDER — OMEPRAZOLE 40 MG/1
40 CAPSULE, DELAYED RELEASE ORAL
Qty: 30 CAPSULE | Refills: 2 | Status: SHIPPED | OUTPATIENT
Start: 2025-07-10 | End: 2025-10-08

## 2025-07-25 ENCOUNTER — TELEPHONE (OUTPATIENT)
Dept: PRIMARY CARE | Facility: CLINIC | Age: 35
End: 2025-07-25
Payer: COMMERCIAL

## 2025-08-27 ENCOUNTER — APPOINTMENT (OUTPATIENT)
Dept: NUTRITION | Facility: HOSPITAL | Age: 35
End: 2025-08-27
Payer: COMMERCIAL

## 2025-09-30 ENCOUNTER — APPOINTMENT (OUTPATIENT)
Dept: NUTRITION | Facility: HOSPITAL | Age: 35
End: 2025-09-30
Payer: COMMERCIAL

## 2026-02-24 ENCOUNTER — APPOINTMENT (OUTPATIENT)
Dept: OBSTETRICS AND GYNECOLOGY | Facility: CLINIC | Age: 36
End: 2026-02-24
Payer: COMMERCIAL

## 2026-04-22 ENCOUNTER — APPOINTMENT (OUTPATIENT)
Dept: PRIMARY CARE | Facility: CLINIC | Age: 36
End: 2026-04-22
Payer: COMMERCIAL